# Patient Record
Sex: FEMALE | Race: WHITE | NOT HISPANIC OR LATINO | Employment: UNEMPLOYED | URBAN - METROPOLITAN AREA
[De-identification: names, ages, dates, MRNs, and addresses within clinical notes are randomized per-mention and may not be internally consistent; named-entity substitution may affect disease eponyms.]

---

## 2022-01-20 ENCOUNTER — HOSPITAL ENCOUNTER (EMERGENCY)
Facility: HOSPITAL | Age: 27
Discharge: HOME | End: 2022-01-21
Attending: EMERGENCY MEDICINE | Admitting: EMERGENCY MEDICINE

## 2022-01-20 DIAGNOSIS — R11.2 NON-INTRACTABLE VOMITING WITH NAUSEA, UNSPECIFIED VOMITING TYPE: ICD-10-CM

## 2022-01-20 DIAGNOSIS — R19.7 DIARRHEA, UNSPECIFIED TYPE: ICD-10-CM

## 2022-01-20 DIAGNOSIS — R10.11 RIGHT UPPER QUADRANT ABDOMINAL PAIN: Primary | ICD-10-CM

## 2022-01-20 DIAGNOSIS — R55 VASOVAGAL SYNCOPE: ICD-10-CM

## 2022-01-20 LAB
ALBUMIN SERPL-MCNC: 4.6 G/DL (ref 3.5–5.2)
ALBUMIN/GLOB SERPL: 1.8 G/DL
ALP SERPL-CCNC: 76 U/L (ref 39–117)
ALT SERPL W P-5'-P-CCNC: 17 U/L (ref 1–33)
ANION GAP SERPL CALCULATED.3IONS-SCNC: 10.7 MMOL/L (ref 5–15)
AST SERPL-CCNC: 20 U/L (ref 1–32)
BASOPHILS # BLD AUTO: 0.08 10*3/MM3 (ref 0–0.2)
BASOPHILS NFR BLD AUTO: 0.9 % (ref 0–1.5)
BILIRUB SERPL-MCNC: 0.2 MG/DL (ref 0–1.2)
BUN SERPL-MCNC: 10 MG/DL (ref 6–20)
BUN/CREAT SERPL: 10.5 (ref 7–25)
CALCIUM SPEC-SCNC: 8.9 MG/DL (ref 8.6–10.5)
CHLORIDE SERPL-SCNC: 106 MMOL/L (ref 98–107)
CO2 SERPL-SCNC: 22.3 MMOL/L (ref 22–29)
CREAT SERPL-MCNC: 0.95 MG/DL (ref 0.57–1)
DEPRECATED RDW RBC AUTO: 45.9 FL (ref 37–54)
EOSINOPHIL # BLD AUTO: 0.16 10*3/MM3 (ref 0–0.4)
EOSINOPHIL NFR BLD AUTO: 1.8 % (ref 0.3–6.2)
ERYTHROCYTE [DISTWIDTH] IN BLOOD BY AUTOMATED COUNT: 14.5 % (ref 12.3–15.4)
GFR SERPL CREATININE-BSD FRML MDRD: 70 ML/MIN/1.73
GLOBULIN UR ELPH-MCNC: 2.5 GM/DL
GLUCOSE SERPL-MCNC: 117 MG/DL (ref 65–99)
HCG SERPL QL: NEGATIVE
HCT VFR BLD AUTO: 38.2 % (ref 34–46.6)
HGB BLD-MCNC: 12.6 G/DL (ref 12–15.9)
HOLD SPECIMEN: NORMAL
IMM GRANULOCYTES # BLD AUTO: 0.01 10*3/MM3 (ref 0–0.05)
IMM GRANULOCYTES NFR BLD AUTO: 0.1 % (ref 0–0.5)
LYMPHOCYTES # BLD AUTO: 2.57 10*3/MM3 (ref 0.7–3.1)
LYMPHOCYTES NFR BLD AUTO: 29.6 % (ref 19.6–45.3)
MAGNESIUM SERPL-MCNC: 1.9 MG/DL (ref 1.6–2.6)
MCH RBC QN AUTO: 28.3 PG (ref 26.6–33)
MCHC RBC AUTO-ENTMCNC: 33 G/DL (ref 31.5–35.7)
MCV RBC AUTO: 85.8 FL (ref 79–97)
MONOCYTES # BLD AUTO: 0.81 10*3/MM3 (ref 0.1–0.9)
MONOCYTES NFR BLD AUTO: 9.3 % (ref 5–12)
NEUTROPHILS NFR BLD AUTO: 5.04 10*3/MM3 (ref 1.7–7)
NEUTROPHILS NFR BLD AUTO: 58.3 % (ref 42.7–76)
NRBC BLD AUTO-RTO: 0 /100 WBC (ref 0–0.2)
PLATELET # BLD AUTO: 208 10*3/MM3 (ref 140–450)
PMV BLD AUTO: 10.8 FL (ref 6–12)
POTASSIUM SERPL-SCNC: 3.6 MMOL/L (ref 3.5–5.2)
PROT SERPL-MCNC: 7.1 G/DL (ref 6–8.5)
RBC # BLD AUTO: 4.45 10*6/MM3 (ref 3.77–5.28)
SODIUM SERPL-SCNC: 139 MMOL/L (ref 136–145)
TROPONIN T SERPL-MCNC: <0.01 NG/ML (ref 0–0.03)
WBC NRBC COR # BLD: 8.67 10*3/MM3 (ref 3.4–10.8)
WHOLE BLOOD HOLD SPECIMEN: NORMAL
WHOLE BLOOD HOLD SPECIMEN: NORMAL

## 2022-01-20 PROCEDURE — 83690 ASSAY OF LIPASE: CPT | Performed by: EMERGENCY MEDICINE

## 2022-01-20 PROCEDURE — 84484 ASSAY OF TROPONIN QUANT: CPT | Performed by: EMERGENCY MEDICINE

## 2022-01-20 PROCEDURE — 93005 ELECTROCARDIOGRAM TRACING: CPT | Performed by: EMERGENCY MEDICINE

## 2022-01-20 PROCEDURE — 83735 ASSAY OF MAGNESIUM: CPT | Performed by: EMERGENCY MEDICINE

## 2022-01-20 PROCEDURE — 85025 COMPLETE CBC W/AUTO DIFF WBC: CPT | Performed by: EMERGENCY MEDICINE

## 2022-01-20 PROCEDURE — 99284 EMERGENCY DEPT VISIT MOD MDM: CPT

## 2022-01-20 PROCEDURE — 84703 CHORIONIC GONADOTROPIN ASSAY: CPT | Performed by: EMERGENCY MEDICINE

## 2022-01-20 PROCEDURE — 93005 ELECTROCARDIOGRAM TRACING: CPT

## 2022-01-20 PROCEDURE — 80053 COMPREHEN METABOLIC PANEL: CPT | Performed by: EMERGENCY MEDICINE

## 2022-01-20 PROCEDURE — 36415 COLL VENOUS BLD VENIPUNCTURE: CPT | Performed by: EMERGENCY MEDICINE

## 2022-01-20 RX ORDER — ONDANSETRON 2 MG/ML
4 INJECTION INTRAMUSCULAR; INTRAVENOUS ONCE
Status: COMPLETED | OUTPATIENT
Start: 2022-01-21 | End: 2022-01-21

## 2022-01-20 RX ORDER — SODIUM CHLORIDE 0.9 % (FLUSH) 0.9 %
10 SYRINGE (ML) INJECTION AS NEEDED
Status: DISCONTINUED | OUTPATIENT
Start: 2022-01-20 | End: 2022-01-21 | Stop reason: HOSPADM

## 2022-01-21 ENCOUNTER — APPOINTMENT (OUTPATIENT)
Dept: ULTRASOUND IMAGING | Facility: HOSPITAL | Age: 27
End: 2022-01-21

## 2022-01-21 VITALS
HEIGHT: 60 IN | HEART RATE: 84 BPM | WEIGHT: 135.36 LBS | OXYGEN SATURATION: 98 % | TEMPERATURE: 98.1 F | RESPIRATION RATE: 16 BRPM | SYSTOLIC BLOOD PRESSURE: 102 MMHG | BODY MASS INDEX: 26.58 KG/M2 | DIASTOLIC BLOOD PRESSURE: 63 MMHG

## 2022-01-21 LAB
BILIRUB UR QL STRIP: NEGATIVE
CLARITY UR: CLEAR
COLOR UR: YELLOW
GLUCOSE UR STRIP-MCNC: NEGATIVE MG/DL
HGB UR QL STRIP.AUTO: NEGATIVE
KETONES UR QL STRIP: NEGATIVE
LEUKOCYTE ESTERASE UR QL STRIP.AUTO: NEGATIVE
LIPASE SERPL-CCNC: 19 U/L (ref 13–60)
NITRITE UR QL STRIP: NEGATIVE
PH UR STRIP.AUTO: 6.5 [PH] (ref 5–8)
PROT UR QL STRIP: NEGATIVE
QT INTERVAL: 358 MS
SP GR UR STRIP: 1.01 (ref 1–1.03)
UROBILINOGEN UR QL STRIP: NORMAL

## 2022-01-21 PROCEDURE — 96374 THER/PROPH/DIAG INJ IV PUSH: CPT

## 2022-01-21 PROCEDURE — 81003 URINALYSIS AUTO W/O SCOPE: CPT | Performed by: EMERGENCY MEDICINE

## 2022-01-21 PROCEDURE — 76705 ECHO EXAM OF ABDOMEN: CPT

## 2022-01-21 PROCEDURE — U0004 COV-19 TEST NON-CDC HGH THRU: HCPCS | Performed by: EMERGENCY MEDICINE

## 2022-01-21 PROCEDURE — 99284 EMERGENCY DEPT VISIT MOD MDM: CPT

## 2022-01-21 PROCEDURE — 25010000002 ONDANSETRON PER 1 MG: Performed by: EMERGENCY MEDICINE

## 2022-01-21 RX ORDER — DICYCLOMINE HCL 20 MG
20 TABLET ORAL EVERY 6 HOURS
Qty: 28 TABLET | Refills: 0 | Status: SHIPPED | OUTPATIENT
Start: 2022-01-21 | End: 2022-01-28

## 2022-01-21 RX ORDER — ONDANSETRON 8 MG/1
8 TABLET, ORALLY DISINTEGRATING ORAL EVERY 8 HOURS PRN
Qty: 30 TABLET | Refills: 0 | Status: SHIPPED | OUTPATIENT
Start: 2022-01-21 | End: 2022-01-31

## 2022-01-21 RX ADMIN — SODIUM CHLORIDE 1000 ML: 9 INJECTION, SOLUTION INTRAVENOUS at 00:04

## 2022-01-21 RX ADMIN — ONDANSETRON 4 MG: 2 INJECTION INTRAMUSCULAR; INTRAVENOUS at 00:02

## 2022-01-21 NOTE — ED PROVIDER NOTES
Time: 1:25 AM EST  Arrived by: private car  Chief Complaint: Abdominal pain and diarrhea  History provided by: Patient  History is limited by: N/A     History of Present Illness:  Patient is a 27 y.o. year old female that presents to the emergency department with with abdominal pain and diarrhea.  The patient states that she has had nausea with intermittent emesis for 3 days.  The patient denies any coffee-ground emesis or hematemesis.  The patient also notes intermittent abdominal pain located in the right upper quadrant.  She locates the pain in the right upper abdomen and radiates to the right side but not to the flank.  The patient states both the nausea, vomiting and abdominal pain seems to be associated with eating.  The patient also notes that she has had diarrhea which she described as watery stools several times a day for the last 2 days.  The patient denies any fevers or rigors.  The patient denies any headache.  The patient denies any sore throat, cough, nasal congestion, chest congestion or shortness of breath.  The patient has had no rash.  The patient's last bowel movement was prior to arrival.  The patient denies any urinary frequency, urgency or dysuria.  The patient has had no gross hematuria.  The patient denies pregnancy as she has had bilateral salpingectomy.  The patient does have a history of PCOD.  Tonight, the patient states that she became nauseated felt like she was going to vomit and have diarrhea.  The patient states that she became lightheaded, sweaty and then passed out.  The patient states that she did not hurt her self.  The patient states that she passed out only transiently.  And returned back to her baseline.  Currently, the patient is complaining of right upper quadrant pain.  Patient has had no prior history of DVT or pulmonary embolism.  The patient has had no recent travel.  The patient has had no recent immobilization.  The patient is not on estrogen.  The patient has no  unilateral leg swelling or calf pain.      Similar Symptoms Previously: The patient states that she did have similar symptoms during her pregnancy and was told it was due to a poorly functioning gallbladder  Recently seen: No      Patient Care Team  Primary Care Provider: Enoch Rios APRN    Past Medical History:     No Known Allergies  Past Medical History:   Diagnosis Date   • ADHD    • PCOS (polycystic ovarian syndrome)      Past Surgical History:   Procedure Laterality Date   • SALPINGECTOMY     • TUBAL ABDOMINAL LIGATION       History reviewed. No pertinent family history.    Home Medications:  Prior to Admission medications    Medication Sig Start Date End Date Taking? Authorizing Provider   acetaminophen (TYLENOL) 500 MG tablet Take 1 tablet by mouth Every 6 (Six) Hours As Needed for Mild Pain . 11/21/21   Bridget Riley MD   azithromycin (Zithromax Z-Kiel) 250 MG tablet Take 2 tab po today then take 1 tab po qd x 4 days 11/21/21   Bridget Riley MD   Benzocaine-Menthol (Cepacol) 15-2.3 MG lozenge Dissolve 1 lozenge in the mouth 4 (Four) Times a Day. 11/21/21   Bridget Riley MD   benzonatate (TESSALON) 200 MG capsule Take 1 capsule by mouth 3 (Three) Times a Day As Needed for Cough. 11/21/21   Bridget Riley MD   brompheniramine-pseudoephedrine-DM 30-2-10 MG/5ML syrup Take 5 mL by mouth 4 (Four) Times a Day As Needed for Cough or Allergies. 11/21/21   Bridget Riley MD   dicyclomine (BENTYL) 20 MG tablet Take 1 tablet by mouth Every 6 (Six) Hours for 7 days. 1/21/22 1/28/22  Toribio Bear DO   ondansetron ODT (ZOFRAN-ODT) 8 MG disintegrating tablet Place 1 tablet on the tongue Every 8 (Eight) Hours As Needed for Nausea or Vomiting for up to 10 days. 1/21/22 1/31/22  Toribio Bear DO        Social History:   Social History     Tobacco Use   • Smoking status: Current Every Day Smoker     Packs/day: 0.50     Types: Cigarettes   • Smokeless tobacco: Never Used   Vaping Use   • Vaping Use: Never used  "  Substance Use Topics   • Alcohol use: Never   • Drug use: Never          Record Review:  I have reviewed the patient's records in [facility].     Review of Systems:  Review of Systems   Constitutional: Positive for appetite change. Negative for chills, diaphoresis, fatigue and fever.   HENT: Negative for congestion, postnasal drip, rhinorrhea, sinus pressure and sore throat.    Eyes: Negative for photophobia.   Respiratory: Negative for apnea, cough, chest tightness, shortness of breath and wheezing.    Cardiovascular: Negative for chest pain, palpitations and leg swelling.   Gastrointestinal: Positive for abdominal pain, diarrhea, nausea and vomiting.   Genitourinary: Negative for difficulty urinating, dysuria, flank pain, frequency, hematuria and urgency.   Musculoskeletal: Negative for arthralgias, back pain, gait problem, joint swelling, myalgias and neck pain.   Skin: Negative for pallor and rash.   Neurological: Positive for syncope. Negative for dizziness, seizures, speech difficulty, weakness, numbness and headaches.   Hematological: Negative.    Psychiatric/Behavioral: Negative.            Physical Exam:  /63 (BP Location: Right arm, Patient Position: Lying)   Pulse 84   Temp 98.1 °F (36.7 °C) (Oral)   Resp 16   Ht 152.4 cm (60\")   Wt 61.4 kg (135 lb 5.8 oz)   SpO2 98%   BMI 26.44 kg/m²     Physical Exam  Vitals and nursing note reviewed.   Constitutional:       General: She is not in acute distress.     Appearance: She is well-developed. She is not ill-appearing, toxic-appearing or diaphoretic.   HENT:      Head: Normocephalic and atraumatic.      Mouth/Throat:      Mouth: Mucous membranes are moist.   Eyes:      Extraocular Movements: Extraocular movements intact.   Cardiovascular:      Rate and Rhythm: Normal rate and regular rhythm.      Pulses: Normal pulses.      Heart sounds: Normal heart sounds. No murmur heard.      Pulmonary:      Effort: Pulmonary effort is normal. No accessory muscle " usage, respiratory distress or retractions.      Breath sounds: Normal breath sounds. No wheezing, rhonchi or rales.   Chest:      Chest wall: No tenderness.   Abdominal:      General: Abdomen is flat. Bowel sounds are normal. There is no distension.      Palpations: Abdomen is soft. There is no hepatomegaly, mass or pulsatile mass.      Tenderness: There is abdominal tenderness in the right upper quadrant. There is no right CVA tenderness, left CVA tenderness, guarding or rebound. Negative signs include Warren's sign and McBurney's sign.      Comments: No rigidity   Musculoskeletal:         General: Normal range of motion.      Cervical back: Normal range of motion and neck supple.      Right lower leg: No edema.      Left lower leg: No edema.   Skin:     General: Skin is warm and dry.      Capillary Refill: Capillary refill takes less than 2 seconds.      Coloration: Skin is not cyanotic, jaundiced or pale.   Neurological:      General: No focal deficit present.      Mental Status: She is alert and oriented to person, place, and time. Mental status is at baseline.      Motor: No weakness.   Psychiatric:         Mood and Affect: Mood normal.         Behavior: Behavior normal.                Medications in the Emergency Department:  Medications   sodium chloride 0.9 % flush 10 mL (has no administration in time range)   sodium chloride 0.9 % bolus 1,000 mL (0 mL Intravenous Stopped 1/21/22 0100)   ondansetron (ZOFRAN) injection 4 mg (4 mg Intravenous Given 1/21/22 0002)        Labs  Lab Results (last 24 hours)     Procedure Component Value Units Date/Time    CBC & Differential [[id number]]  (Normal) Collected: 01/20/22 2246    Specimen: Blood Updated: 01/20/22 2302    Narrative:      The following orders were created for panel order CBC & Differential.  Procedure                               Abnormality         Status                     ---------                               -----------         ------                      CBC Auto Differential[[id number]]        Normal              Final result                 Please view results for these tests on the individual orders.    Comprehensive Metabolic Panel [[id number]]  (Abnormal) Collected: 01/20/22 2246    Specimen: Blood Updated: 01/20/22 2329     Glucose 117 mg/dL      BUN 10 mg/dL      Creatinine 0.95 mg/dL      Sodium 139 mmol/L      Potassium 3.6 mmol/L      Chloride 106 mmol/L      CO2 22.3 mmol/L      Calcium 8.9 mg/dL      Total Protein 7.1 g/dL      Albumin 4.60 g/dL      ALT (SGPT) 17 U/L      AST (SGOT) 20 U/L      Alkaline Phosphatase 76 U/L      Total Bilirubin 0.2 mg/dL      eGFR Non African Amer 70 mL/min/1.73      Globulin 2.5 gm/dL      A/G Ratio 1.8 g/dL      BUN/Creatinine Ratio 10.5     Anion Gap 10.7 mmol/L     Narrative:      GFR Normal >60  Chronic Kidney Disease <60  Kidney Failure <15      Magnesium [[id number]]  (Normal) Collected: 01/20/22 2246    Specimen: Blood Updated: 01/20/22 2329     Magnesium 1.9 mg/dL     Troponin [[id number]]  (Normal) Collected: 01/20/22 2246    Specimen: Blood Updated: 01/20/22 2329     Troponin T <0.010 ng/mL     Narrative:      Troponin T Reference Range:  <= 0.03 ng/mL-   Negative for AMI  >0.03 ng/mL-     Abnormal for myocardial necrosis.  Clinicians would have to utilize clinical acumen, EKG, Troponin and serial changes to determine if it is an Acute Myocardial Infarction or myocardial injury due to an underlying chronic condition.       Results may be falsely decreased if patient taking Biotin.      hCG, Serum, Qualitative [[id number]]  (Normal) Collected: 01/20/22 2246    Specimen: Blood Updated: 01/20/22 2315     HCG Qualitative Negative    Narrative:      Sensitive immunoassays may demonstrate false positive results  with specimens containing heterophilic antibodies. Assays may  also exhibit false-positive or false-negative results with  specimens containing human anti-mouse antibodies. These   specimens may come from  patients receving preparations of  mouse monoclonal antibodies for diagnosis or therapy or having  been exposed to mice. If the qualitative interpretation is  inconsistent with the clinical evaluation, results should be   confirmed by an alternate hCG method, ie. quantitative hCG.    CBC Auto Differential [[id number]]  (Normal) Collected: 01/20/22 2246    Specimen: Blood Updated: 01/20/22 2302     WBC 8.67 10*3/mm3      RBC 4.45 10*6/mm3      Hemoglobin 12.6 g/dL      Hematocrit 38.2 %      MCV 85.8 fL      MCH 28.3 pg      MCHC 33.0 g/dL      RDW 14.5 %      RDW-SD 45.9 fl      MPV 10.8 fL      Platelets 208 10*3/mm3      Neutrophil % 58.3 %      Lymphocyte % 29.6 %      Monocyte % 9.3 %      Eosinophil % 1.8 %      Basophil % 0.9 %      Immature Grans % 0.1 %      Neutrophils, Absolute 5.04 10*3/mm3      Lymphocytes, Absolute 2.57 10*3/mm3      Monocytes, Absolute 0.81 10*3/mm3      Eosinophils, Absolute 0.16 10*3/mm3      Basophils, Absolute 0.08 10*3/mm3      Immature Grans, Absolute 0.01 10*3/mm3      nRBC 0.0 /100 WBC     Lipase [[id number]]  (Normal) Collected: 01/20/22 2246    Specimen: Blood Updated: 01/21/22 0007     Lipase 19 U/L     Urinalysis With Culture If Indicated - Urine, Clean Catch [[id number]]  (Normal) Collected: 01/21/22 0013    Specimen: Urine, Clean Catch Updated: 01/21/22 0025     Color, UA Yellow     Appearance, UA Clear     pH, UA 6.5     Specific Gravity, UA 1.010     Glucose, UA Negative     Ketones, UA Negative     Bilirubin, UA Negative     Blood, UA Negative     Protein, UA Negative     Leuk Esterase, UA Negative     Nitrite, UA Negative     Urobilinogen, UA 0.2 E.U./dL    Narrative:      Urine microscopic not indicated.           Imaging:  US Gallbladder   Final Result       No acute cholecystitis. No gallstones. No biliary ductal dilatation. The other findings are as    detailed above.                   ALKA VERNON JR, MD          Electronically Signed and Approved By: ALKA FINCH  JANEEN TINAJERO MD on 1/21/2022 at 0:40                               Procedures:  Procedures    Progress  ED Course as of 01/21/22 0133   Fri Jan 21, 2022   0121 EKG:    Rhythm: Normal sinus rhythm  Rate: 91  Intervals: Normal OR and QT interval  T-wave: Nonspecific T wave flattening in III  ST Segment: No obvious pathological ST elevation or ST depression    EKG Comparison: None available    Interpreted by me   [SD]      ED Course User Index  [SD] Toribio Bear DO                            Medical Decision Making:  MDM  Number of Diagnoses or Management Options  Diarrhea, unspecified type  Non-intractable vomiting with nausea, unspecified vomiting type  Right upper quadrant abdominal pain  Vasovagal syncope  Diagnosis management comments:     PERC Rule for Pulmonary Embolism - MDCalc  Calculated on Jan 21 2022 1:31 AM  0 criteria -> No need for further workup, as <2% chance of PE. If no criteria are positive and clinician's pre-test probability is <15%, PERC Rule criteria are satisfied.          The patient is resting comfortably and feels better, is alert and in no distress.  Repeat examination is unremarkable and benign; in particular, there is no discomfort at McBurney's point and there is no pulsatile mass.  The history, exam, diagnostic testing and current condition does not suggest acute appendicitis, bowel obstruction, acute cholecystitis bowel perforation, major gastrointestinal bleeding, severe diverticulitis, abdominal aortic aneurysm, mesenteric ischemia, volvulus, sepsis or other significant pathology that warrants further testing, continued ED treatment, admission for surgical evaluation at this point.  The vital signs have been stable.  The patient does not have uncontrolled pain intractable vomiting or other significant symptoms.  The patient's condition is stable and appropriate for discharge from the emergency department.  The patient will follow up with her primary care physician for serial  reexamination of the abdomen tomorrow.  The patient was instructed to return should they have worsening pain, intractable vomiting, fever or new symptoms    The patient had no dysrhythmias in the emergency room.  The patient's vital signs were stable.  The patient's EKG was normal.  The patient became nauseated and had the feeling of diarrhea at home.  Then the patient had a syncopal episode.  The patient had a normal chemistry, including BUN and creatinine and electrolytes.  The patient had a normal hemoglobin hematocrit.  It is thought that the patient has a cause-and-effect relationship for a vasovagal event.  I have discussed this with the patient and she has voiced understanding.       Amount and/or Complexity of Data Reviewed  Clinical lab tests: reviewed  Tests in the radiology section of CPT®: reviewed  Tests in the medicine section of CPT®: reviewed               Final diagnoses:   Right upper quadrant abdominal pain   Non-intractable vomiting with nausea, unspecified vomiting type   Diarrhea, unspecified type   Vasovagal syncope        Disposition:  ED Disposition     ED Disposition Condition Comment    Discharge Stable           This medical record created using voice recognition software and may contain unintended errors.    DO Chema Douglas Scott, DO  01/24/22 0637

## 2022-01-21 NOTE — DISCHARGE INSTRUCTIONS
Please drink clear liquids for the next 12 hours and then advance your diet very slowly.    Please push oral fluids    Follow-up with your doctor tomorrow for serial reexamination the abdomen and to review your COVID-19 results    Discuss possible need for HIDA scan of the gallbladder with your primary care physician    Return to the emergency room for intractable pain, intractable vomiting, passing out, fever, chest pain, shortness of breath, unusual fatigue, unusual sweating or any new symptoms you are concerned with

## 2022-01-21 NOTE — ED TRIAGE NOTES
Pt to ED 9 with c/o intermittent fatigue/nausea/diarrhea/abd pain over last several days. Pt reports recently moved here and has not established a pcp yet.   Pt reports has been having this issues for awhile and former pcp wanted her to see specialist but sx had resolved and pt was in middle of moving.  Pt states before has felt like going to pass out but this had not happened until tonight.  Pt reports x4 episodes of emesis over last couple days, denies blood in emesis.  Pt presents a/ox4, gcs 15, in nad while sitting up in bed.

## 2022-01-22 LAB — SARS-COV-2 RNA PNL SPEC NAA+PROBE: NOT DETECTED

## 2022-02-07 ENCOUNTER — OFFICE VISIT (OUTPATIENT)
Dept: OBSTETRICS AND GYNECOLOGY | Facility: CLINIC | Age: 27
End: 2022-02-07

## 2022-02-07 VITALS
BODY MASS INDEX: 26.11 KG/M2 | SYSTOLIC BLOOD PRESSURE: 110 MMHG | HEIGHT: 60 IN | WEIGHT: 133 LBS | HEART RATE: 98 BPM | DIASTOLIC BLOOD PRESSURE: 61 MMHG

## 2022-02-07 DIAGNOSIS — E28.2 PCOS (POLYCYSTIC OVARIAN SYNDROME): ICD-10-CM

## 2022-02-07 DIAGNOSIS — L68.0 FEMALE HIRSUTISM: ICD-10-CM

## 2022-02-07 DIAGNOSIS — Z01.411 ENCOUNTER FOR GYNECOLOGICAL EXAMINATION WITH ABNORMAL FINDING: Primary | ICD-10-CM

## 2022-02-07 PROCEDURE — 99385 PREV VISIT NEW AGE 18-39: CPT | Performed by: OBSTETRICS & GYNECOLOGY

## 2022-02-07 PROCEDURE — G0123 SCREEN CERV/VAG THIN LAYER: HCPCS | Performed by: OBSTETRICS & GYNECOLOGY

## 2022-02-07 PROCEDURE — 99213 OFFICE O/P EST LOW 20 MIN: CPT | Performed by: OBSTETRICS & GYNECOLOGY

## 2022-02-07 RX ORDER — SPIRONOLACTONE 100 MG/1
100 TABLET, FILM COATED ORAL DAILY
Qty: 90 TABLET | Refills: 3 | Status: SHIPPED | OUTPATIENT
Start: 2022-02-07 | End: 2022-11-05

## 2022-02-07 NOTE — PROGRESS NOTES
"Well Woman Visit    CC: Annual well woman exam       HPI:   27 y.o.No obstetric history on file. Contraception or HRT: APCONTRACEPTIONHRT: Contraception:  Bilateral salpingectomy  Menses:   q mon, lasts 5 days, changes products q 1hrs on heaviest days.   Pain:  Moderate, not too bad  Incontinence concerns: No  Hx of abnormal pap:  No  Pt has concerns she would like to discuss. c/o excessive chin hair growth due to pcos. Desires tx. Has tried laser hair removal twice.      History: PMHx, Meds, Allergies, PSHx, Social Hx, and POBHx all reviewed and updated.      PHYSICAL EXAM:  /61   Pulse 98   Ht 152.4 cm (60\")   Wt 60.3 kg (133 lb)   LMP 01/24/2022 (Approximate)   BMI 25.97 kg/m²   General- NAD, alert and oriented, appropriate  Psych- Normal mood, good memory  Neck- No masses, no thyroid enlargement  CV- Regular rhythm, no murnurs  Resp- CTA to bases, no wheezes  Abdomen- Soft, non distended, non tender, no masses    Breast left-  Bilaterally symmetrical, no masses, non tender, no nipple discharge  Breast right- Bilaterally symmetrical, no masses, non tender, no nipple discharge    External genitalia- Normal female, no lesions  Urethra/meatus- Normal, no masses, non tender, no prolapse  Bladder- Normal, no masses, non tender, no prolapse  Vagina- Normal, no atrophy, no lesions, no discharge, no prolapse  Cvx- Normal, no lesions, no discharge, No cervical motion tenderness  Uterus- Normal size, shape & consistency.  Non tender, mobile, & no prolapse  Adnexa- No mass, non tender  Anus/Rectum/Perineum- Not performed    Lymphatic- No palpable neck, axillary, or groin nodes  Ext- No edema, no cyanosis    Skin- No lesions, no rashes, no acanthosis nigricans        ASSESSMENT and PLAN:  WWE and Problem Visit    Diagnoses and all orders for this visit:    1. Encounter for gynecological examination with abnormal finding (Primary)  -     IGP,rfx Aptima HPV All Pth    2. PCOS (polycystic ovarian syndrome)  -     " spironolactone (Aldactone) 100 MG tablet; Take 1 tablet by mouth Daily.  Dispense: 90 tablet; Refill: 3    3. Female hirsutism  -     spironolactone (Aldactone) 100 MG tablet; Take 1 tablet by mouth Daily.  Dispense: 90 tablet; Refill: 3    discussed tx options. Pt desires spironolactone. Will f/u in 6 weeks to re-eval.    Counseling:     Track menses, RTO IF <q21d, >7d long, or heavy    Domestic violence/abuse screen: negative    Depression screen: no SI    Preventative:   BREAST HEALTH- Monthly self breast exam importance and how to reviewed. MMG and/or MRI (prn) reviewed per society guidelines and her individual history. Mammo/MRI screen: Not medically needed.  CERVICAL CANCER Screening- Reviewed current ASCCP guidelines for screening w and wo cotest HPV, age specific.  Screen: Updated today.  COLON CANCER Screening- Reviewed current medical society guidelines and options.  Colonoscopy screen:  Not medically needed.  SEXUAL HEALTH: Declines STD screening.  VACCINATIONS Recommended: Flu annually, Gardisil/HPV vaccine (up to 44yo).  Importance discussed, risk being unvaccinated reviewed.  Questions answered  SMOKING STATUS- pt does use nicotine and/or tobacco.  I reviewed importance of avoiding.  Options to quit offered per [employer] protocols.  Recommend FU w PCP regarding quitting options if unable to quit wo assistance.  Myriad: Does not qualify.  Gardasil status: completed.      She understands the importance of having any ordered tests to be performed in a timely fashion.  She is encouraged to review her results online and/or contact or office if she has questions.     Follow Up:  Return in about 6 weeks (around 3/21/2022) for med f/u-phone appt farideh.      Keke Fortune, APRN  02/07/2022

## 2022-02-09 LAB
CONV .: NORMAL
CYTOLOGIST CVX/VAG CYTO: NORMAL
CYTOLOGY CVX/VAG DOC CYTO: NORMAL
CYTOLOGY CVX/VAG DOC THIN PREP: NORMAL
DX ICD CODE: NORMAL
HIV 1 & 2 AB SER-IMP: NORMAL
Lab: NORMAL
OTHER STN SPEC: NORMAL
STAT OF ADQ CVX/VAG CYTO-IMP: NORMAL

## 2022-02-20 PROCEDURE — U0004 COV-19 TEST NON-CDC HGH THRU: HCPCS | Performed by: FAMILY MEDICINE

## 2022-03-21 ENCOUNTER — OFFICE VISIT (OUTPATIENT)
Dept: OBSTETRICS AND GYNECOLOGY | Facility: CLINIC | Age: 27
End: 2022-03-21

## 2022-03-21 DIAGNOSIS — E28.2 PCOS (POLYCYSTIC OVARIAN SYNDROME): ICD-10-CM

## 2022-03-21 DIAGNOSIS — L68.0 FEMALE HIRSUTISM: Primary | ICD-10-CM

## 2022-03-21 PROCEDURE — 99442 PR PHYS/QHP TELEPHONE EVALUATION 11-20 MIN: CPT | Performed by: OBSTETRICS & GYNECOLOGY

## 2022-03-21 NOTE — PROGRESS NOTES
GYN Problem/Follow Up Visit    Chief Complaint   Patient presents with   • Follow-up     meds      You have chosen to receive care through a telephone visit. Do you consent to use a telephone visit for your medical care today? Yes       HPI  Chelsy Patterson is a 27 y.o. female, No obstetric history on file., who presents for med f/u. Seen in office 2/7/22 with c/o excessive chin hair growth due to pcos. Has tried laser hair removal twice. Started on spironolactone at that time. Today states has not noticed any improvement. Does admit to not taking it every single day as directed but usually takes it.        Additional OB/GYN History   No LMP recorded. (Menstrual status: Tubal ligation).  Allergies : Patient has no known allergies.     The additional following portions of the patient's history were reviewed and updated as appropriate: allergies, current medications, past family history, past medical history, past social history, past surgical history and problem list.    Review of Systems    I have reviewed and agree with the HPI, ROS, and historical information as entered above. Keke Fortune, JENNIFER    Objective   There were no vitals taken for this visit.    Physical Exam  Neurological:      Mental Status: She is alert and oriented to person, place, and time.            Assessment and Plan    Diagnoses and all orders for this visit:    1. Female hirsutism (Primary)  -     Ambulatory Referral to Dermatology    2. PCOS (polycystic ovarian syndrome)  -     Ambulatory Referral to Dermatology    discussed taking med daily. Set alarm/reminder if needed. Discussed seeing derm for an eval and pt desires. Will place referral. Pt states will continue spironolactone also. rto prn.    Counseling:  She understands the importance of having the above orders performed in a timely fashion.  She is encouraged to review her results online and/or contact or office if she has questions.     Follow Up:  Return if symptoms worsen or  fail to improve.    This visit has been rescheduled as a phone visit to comply with patient safety concerns in accordance with CDC recommendations. Total time of discussion was 11 minutes.      JENNIFER Kearns  03/21/2022

## 2022-06-15 ENCOUNTER — LAB (OUTPATIENT)
Dept: LAB | Facility: HOSPITAL | Age: 27
End: 2022-06-15

## 2022-06-15 ENCOUNTER — TRANSCRIBE ORDERS (OUTPATIENT)
Dept: ADMINISTRATIVE | Facility: HOSPITAL | Age: 27
End: 2022-06-15

## 2022-06-15 DIAGNOSIS — L64.8 OTHER ANDROGENIC ALOPECIA: ICD-10-CM

## 2022-06-15 DIAGNOSIS — L64.8 OTHER ANDROGENIC ALOPECIA: Primary | ICD-10-CM

## 2022-06-15 LAB
25(OH)D3 SERPL-MCNC: 32.8 NG/ML (ref 30–100)
ALBUMIN SERPL-MCNC: 4.3 G/DL (ref 3.5–5.2)
ALBUMIN/GLOB SERPL: 1.5 G/DL
ALP SERPL-CCNC: 71 U/L (ref 39–117)
ALT SERPL W P-5'-P-CCNC: 7 U/L (ref 1–33)
ANION GAP SERPL CALCULATED.3IONS-SCNC: 12 MMOL/L (ref 5–15)
AST SERPL-CCNC: 13 U/L (ref 1–32)
BASOPHILS # BLD AUTO: 0.06 10*3/MM3 (ref 0–0.2)
BASOPHILS NFR BLD AUTO: 1 % (ref 0–1.5)
BILIRUB SERPL-MCNC: 0.4 MG/DL (ref 0–1.2)
BUN SERPL-MCNC: 4 MG/DL (ref 6–20)
BUN/CREAT SERPL: 4.9 (ref 7–25)
CALCIUM SPEC-SCNC: 8.9 MG/DL (ref 8.6–10.5)
CHLORIDE SERPL-SCNC: 102 MMOL/L (ref 98–107)
CO2 SERPL-SCNC: 25 MMOL/L (ref 22–29)
CREAT SERPL-MCNC: 0.82 MG/DL (ref 0.57–1)
DEPRECATED RDW RBC AUTO: 40.7 FL (ref 37–54)
EGFRCR SERPLBLD CKD-EPI 2021: 99.4 ML/MIN/1.73
EOSINOPHIL # BLD AUTO: 0.14 10*3/MM3 (ref 0–0.4)
EOSINOPHIL NFR BLD AUTO: 2.2 % (ref 0.3–6.2)
ERYTHROCYTE [DISTWIDTH] IN BLOOD BY AUTOMATED COUNT: 13.1 % (ref 12.3–15.4)
FERRITIN SERPL-MCNC: 14.9 NG/ML (ref 13–150)
GLOBULIN UR ELPH-MCNC: 2.8 GM/DL
GLUCOSE SERPL-MCNC: 110 MG/DL (ref 65–99)
HCT VFR BLD AUTO: 39.8 % (ref 34–46.6)
HGB BLD-MCNC: 13 G/DL (ref 12–15.9)
IMM GRANULOCYTES # BLD AUTO: 0.01 10*3/MM3 (ref 0–0.05)
IMM GRANULOCYTES NFR BLD AUTO: 0.2 % (ref 0–0.5)
IRON 24H UR-MRATE: 79 MCG/DL (ref 37–145)
IRON SATN MFR SERPL: 17 % (ref 20–50)
LYMPHOCYTES # BLD AUTO: 1.83 10*3/MM3 (ref 0.7–3.1)
LYMPHOCYTES NFR BLD AUTO: 29.4 % (ref 19.6–45.3)
MCH RBC QN AUTO: 28.2 PG (ref 26.6–33)
MCHC RBC AUTO-ENTMCNC: 32.7 G/DL (ref 31.5–35.7)
MCV RBC AUTO: 86.3 FL (ref 79–97)
MONOCYTES # BLD AUTO: 0.53 10*3/MM3 (ref 0.1–0.9)
MONOCYTES NFR BLD AUTO: 8.5 % (ref 5–12)
NEUTROPHILS NFR BLD AUTO: 3.66 10*3/MM3 (ref 1.7–7)
NEUTROPHILS NFR BLD AUTO: 58.7 % (ref 42.7–76)
NRBC BLD AUTO-RTO: 0 /100 WBC (ref 0–0.2)
PLATELET # BLD AUTO: 224 10*3/MM3 (ref 140–450)
PMV BLD AUTO: 12 FL (ref 6–12)
POTASSIUM SERPL-SCNC: 3.7 MMOL/L (ref 3.5–5.2)
PROT SERPL-MCNC: 7.1 G/DL (ref 6–8.5)
RBC # BLD AUTO: 4.61 10*6/MM3 (ref 3.77–5.28)
SODIUM SERPL-SCNC: 139 MMOL/L (ref 136–145)
T3FREE SERPL-MCNC: 4.5 PG/ML (ref 2–4.4)
T4 FREE SERPL-MCNC: 1.25 NG/DL (ref 0.93–1.7)
TIBC SERPL-MCNC: 459 MCG/DL (ref 298–536)
TRANSFERRIN SERPL-MCNC: 308 MG/DL (ref 200–360)
TSH SERPL DL<=0.05 MIU/L-ACNC: 1.93 UIU/ML (ref 0.27–4.2)
WBC NRBC COR # BLD: 6.23 10*3/MM3 (ref 3.4–10.8)

## 2022-06-15 PROCEDURE — 86038 ANTINUCLEAR ANTIBODIES: CPT

## 2022-06-15 PROCEDURE — 84403 ASSAY OF TOTAL TESTOSTERONE: CPT

## 2022-06-15 PROCEDURE — 80050 GENERAL HEALTH PANEL: CPT

## 2022-06-15 PROCEDURE — 84466 ASSAY OF TRANSFERRIN: CPT

## 2022-06-15 PROCEDURE — 82728 ASSAY OF FERRITIN: CPT

## 2022-06-15 PROCEDURE — 84439 ASSAY OF FREE THYROXINE: CPT

## 2022-06-15 PROCEDURE — 82306 VITAMIN D 25 HYDROXY: CPT

## 2022-06-15 PROCEDURE — 83540 ASSAY OF IRON: CPT

## 2022-06-15 PROCEDURE — 84481 FREE ASSAY (FT-3): CPT

## 2022-06-15 PROCEDURE — 36415 COLL VENOUS BLD VENIPUNCTURE: CPT

## 2022-06-16 LAB — ANA SER QL IF: NEGATIVE

## 2022-06-21 LAB — TESTOST SERPL-MCNC: 47 NG/DL

## 2023-04-18 ENCOUNTER — OFFICE VISIT (OUTPATIENT)
Dept: OBSTETRICS AND GYNECOLOGY | Facility: CLINIC | Age: 28
End: 2023-04-18
Payer: COMMERCIAL

## 2023-04-18 VITALS
HEIGHT: 60 IN | DIASTOLIC BLOOD PRESSURE: 65 MMHG | SYSTOLIC BLOOD PRESSURE: 108 MMHG | BODY MASS INDEX: 24.15 KG/M2 | WEIGHT: 123 LBS | HEART RATE: 90 BPM

## 2023-04-18 DIAGNOSIS — R10.2 PELVIC PAIN IN FEMALE: Primary | ICD-10-CM

## 2023-04-18 DIAGNOSIS — Z91.89 INCREASED RISK FOR HEREDITARY CANCER SYNDROME: ICD-10-CM

## 2023-04-18 DIAGNOSIS — N92.0 MENORRHAGIA WITH REGULAR CYCLE: ICD-10-CM

## 2023-04-18 DIAGNOSIS — Z80.3 FAMILY HISTORY OF BREAST CANCER: ICD-10-CM

## 2023-04-18 DIAGNOSIS — Z80.9 INCREASED RISK FOR HEREDITARY CANCER SYNDROME: ICD-10-CM

## 2023-04-18 NOTE — PROGRESS NOTES
"GYN Problem/Follow Up Visit    Chief Complaint   Patient presents with   • Pelvic Pain           HPI  Chelsy Patterson is a 28 y.o. female, No obstetric history on file., who presents for pelvic pain. States menses and pain have been getting worse for awhile. q mon menses x 4-5 days, 2 heavy days, changing products hourly at times. C/o cramping right before, during, and right after her menses. Has also noticed pelvic pain sometimes during intercourse and at other random times. Feels pain/pressure when she squats down. States the pain is worse in the left side of her pelvis. Has pcos and has hx of ovarian cysts but states this feels different. Denies vaginal sx. Also states is adopted but recently found out a lot of fam hx.      Additional OB/GYN History   Patient's last menstrual period was 04/04/2023 (approximate).  Current contraception: contraceptive methods: salpingectomy  Desires to: continue contraception  Allergies : Patient has no known allergies.     The additional following portions of the patient's history were reviewed and updated as appropriate: allergies, current medications, past family history, past medical history, past social history, past surgical history and problem list.    Review of Systems    I have reviewed and agree with the HPI, ROS, and historical information as entered above. Keke Fortune, APRN    Objective   /65   Pulse 90   Ht 152.4 cm (60\")   Wt 55.8 kg (123 lb)   LMP 04/04/2023 (Approximate)   BMI 24.02 kg/m²     Physical Exam  Vitals reviewed.   Genitourinary:     General: Normal vulva.      Vagina: Normal.      Cervix: Normal.      Uterus: Normal. Not tender.       Adnexa:         Right: Tenderness present.         Left: Tenderness present.    Skin:     General: Skin is warm and dry.   Neurological:      Mental Status: She is alert and oriented to person, place, and time.            Assessment and Plan    Diagnoses and all orders for this visit:    1. Pelvic pain in " female (Primary)  -     US Non-ob Transvaginal; Future    2. Menorrhagia with regular cycle  -     US Non-ob Transvaginal; Future    3. Family history of breast cancer  -     [facility] Hereditary Cancer Screen    4. Increased risk for hereditary cancer syndrome  -     [facility] Hereditary Cancer Screen    will check pelvic u/s. Discussed hormonal agents to help with sx. Pt states she has tried bcp and nexplanon and did not do well with the side effects. May also need eval by gyn doc. Will f/u after u/s. Pt is also a candidate for genetic testing and desires proceeding with that.     Counseling:  She understands the importance of having the above orders performed in a timely fashion.  She is encouraged to review her results online and/or contact or office if she has questions.     Follow Up:  Return in about 6 weeks (around 5/30/2023) for myriad f/u, u/s f/u.      Keke Fortune, JENNIFER  04/18/2023

## 2023-04-20 ENCOUNTER — HOSPITAL ENCOUNTER (OUTPATIENT)
Dept: ULTRASOUND IMAGING | Facility: HOSPITAL | Age: 28
Discharge: HOME | End: 2023-04-20
Admitting: OBSTETRICS & GYNECOLOGY
Payer: COMMERCIAL

## 2023-04-20 DIAGNOSIS — R10.2 PELVIC PAIN IN FEMALE: ICD-10-CM

## 2023-04-20 DIAGNOSIS — N92.0 MENORRHAGIA WITH REGULAR CYCLE: ICD-10-CM

## 2023-04-20 PROCEDURE — 76830 TRANSVAGINAL US NON-OB: CPT

## 2023-05-01 ENCOUNTER — TELEPHONE (OUTPATIENT)
Dept: OBSTETRICS AND GYNECOLOGY | Facility: CLINIC | Age: 28
End: 2023-05-01

## 2023-05-01 NOTE — TELEPHONE ENCOUNTER
Caller: Chelsy Patterson    Relationship to patient: Self    Best call back number: [phone]    Chief complaint: PATIENT HAD ULTRASOUND COMPLETE AND GOT RESULTS THAT THERE IS FIBROIDS - PATIENT STATES AWAITING GENETIC TESTING RESULTS - STATES THAT JENNIFER SMITH HAD HER SCHEDULE A 6 WEEK FOLLOW UP HOWEVER PATIENT IS WANTING TO KNOW IF SHE CAN COME IN SOONER IN CASE THERE IS ANY OTHER TESTING THAT WILL NEED TO BE DONE    Type of visit: GYN FOLLOW UP    Requested date: ASAP    If rescheduling, when is the original appointment: 5/30/23

## 2023-05-11 LAB — REF LAB TEST METHOD: NORMAL

## 2023-05-16 ENCOUNTER — OFFICE VISIT (OUTPATIENT)
Dept: OBSTETRICS AND GYNECOLOGY | Facility: CLINIC | Age: 28
End: 2023-05-16
Payer: COMMERCIAL

## 2023-05-16 VITALS
BODY MASS INDEX: 23.64 KG/M2 | HEART RATE: 77 BPM | SYSTOLIC BLOOD PRESSURE: 100 MMHG | HEIGHT: 60 IN | DIASTOLIC BLOOD PRESSURE: 60 MMHG | WEIGHT: 120.4 LBS

## 2023-05-16 DIAGNOSIS — G89.29 CHRONIC PELVIC PAIN IN FEMALE: Primary | ICD-10-CM

## 2023-05-16 DIAGNOSIS — D25.9 UTERINE LEIOMYOMA, UNSPECIFIED LOCATION: ICD-10-CM

## 2023-05-16 DIAGNOSIS — R10.2 CHRONIC PELVIC PAIN IN FEMALE: Primary | ICD-10-CM

## 2023-05-16 PROBLEM — E28.2 POLYCYSTIC OVARY SYNDROME: Status: ACTIVE | Noted: 2020-02-20

## 2023-05-16 PROCEDURE — 99214 OFFICE O/P EST MOD 30 MIN: CPT | Performed by: OBSTETRICS & GYNECOLOGY

## 2023-05-16 NOTE — PROGRESS NOTES
"GYN Visit    CC: Follow-up ultrasound    HPI:   28 y.o. who presents in follow up for pelvic pain.  The patient was initially evaluated by one of the nurse practitioners who ordered a pelvic ultrasound.  The patient is here to discuss those ultrasound results.  The patient reports having lower abdominal and pelvic pain throughout the month is worse with her menstrual cycle and with certain movements.  The pain is particular worse with bending over and certain positions and lifting.      History: PMHx, Meds, Allergies, PSHx, Social Hx, and POBHx all reviewed and updated.    /60   Pulse 77   Ht 152.4 cm (60\")   Wt 54.6 kg (120 lb 6.4 oz)   LMP 2023 (Within Days)   BMI 23.51 kg/m²     Physical Exam  Vitals and nursing note reviewed. Exam conducted with a chaperone present.   Constitutional:       General: She is not in acute distress.     Appearance: Normal appearance. She is not ill-appearing.   Musculoskeletal:         General: No swelling.      Right lower leg: No edema.      Left lower leg: No edema.   Skin:     General: Skin is warm and dry.      Findings: No rash.   Neurological:      Mental Status: She is alert and oriented to person, place, and time.   Psychiatric:         Mood and Affect: Mood normal.         Behavior: Behavior normal.         Thought Content: Thought content normal.         Judgment: Judgment normal.       Pelvic ultrasound 2023 reviewed    ASSESSMENT AND PLAN:  Diagnoses and all orders for this visit:    1. Chronic pelvic pain in female (Primary)  Assessment & Plan:  Discussed etiology of chronic pelvic pain including both gynecologic and not gynecologic etiologies.  We discussed that overall the patient's ultrasound is reassuring.  There is nothing definitive on the ultrasound that would make me think this is a absolute cause of the patient's pelvic pain.  We discussed that pelvic ultrasound and other types of imaging such as CT and MRI cannot identify all " sources of pelvic pain.  We discussed options for further management including medical therapy with hormonal medication such as birth control pills, which we discussed are very effective at controlling symptoms from many gynecologic causes of pelvic pain including things like endometriosis.  We discussed the option of proceeding with a diagnostic laparoscopy to further evaluate the etiology of the patient's pelvic pain.  We discussed referral back to her primary care physician to evaluate for non gynecologic etiologies of pelvic pain given that there is no clear gynecologic source.  In discussing the patient's symptoms, I do think her symptoms could be most consistent with a pelvic floor injury.  We discussed a referral for pelvic floor therapy to see if this could improve her symptoms.  After spending a great deal of time with the patient and reviewing her options she is still undecided about how she would like to proceed.  She did seem somewhat frustrated that I cannot give her a definitive diagnosis today.  We discussed the difficulties in evaluating pelvic pain and pain in general as there are so many possibilities that could be the source of pain.  We also discussed that imaging studies do not show pain.  They can only show things that potentially could cause pain.  After discussion and answering the patient's questions she did seem satisfied.  She said she would like to take a few days to consider her options and she will let me know how she wishes to proceed.  In the meantime I recommend she continue with OTC NSAIDs to treat her pelvic pain.  If her pain worsens and she is not finding relief then I recommended she return to the office or go to the ER for further evaluation.      2. Uterine leiomyoma, unspecified location  Assessment & Plan:  No description of the quantity or size of the possible fibroids is provided in the pelvic ultrasound.  I have discussed with the patient that unless fibroids are  significantly enlarged causing compressive features of other structures typically they are not associated with significant pelvic pain.  Given the fibroids are not described in their quantity or size I think it is unlikely they are contributing to the patient's symptoms.      I spent greater than 45 minutes with this patient's visit including reviewing the patient's chart and medical records and occluding laboratories and imaging, taking an appropriate history and performing an appropriate physical examination, counseling and discussing treatment options, and documentation    Follow Up:  Return if symptoms worsen or fail to improve.    Jason Frankel MD  05/16/2023

## 2023-05-22 PROBLEM — D25.9 UTERINE LEIOMYOMA: Status: ACTIVE | Noted: 2023-05-22

## 2023-05-22 PROBLEM — R10.2 CHRONIC PELVIC PAIN IN FEMALE: Status: ACTIVE | Noted: 2023-05-22

## 2023-05-22 PROBLEM — G89.29 CHRONIC PELVIC PAIN IN FEMALE: Status: ACTIVE | Noted: 2023-05-22

## 2023-05-22 NOTE — ASSESSMENT & PLAN NOTE
Discussed etiology of chronic pelvic pain including both gynecologic and not gynecologic etiologies.  We discussed that overall the patient's ultrasound is reassuring.  There is nothing definitive on the ultrasound that would make me think this is a absolute cause of the patient's pelvic pain.  We discussed that pelvic ultrasound and other types of imaging such as CT and MRI cannot identify all sources of pelvic pain.  We discussed options for further management including medical therapy with hormonal medication such as birth control pills, which we discussed are very effective at controlling symptoms from many gynecologic causes of pelvic pain including things like endometriosis.  We discussed the option of proceeding with a diagnostic laparoscopy to further evaluate the etiology of the patient's pelvic pain.  We discussed referral back to her primary care physician to evaluate for non gynecologic etiologies of pelvic pain given that there is no clear gynecologic source.  In discussing the patient's symptoms, I do think her symptoms could be most consistent with a pelvic floor injury.  We discussed a referral for pelvic floor therapy to see if this could improve her symptoms.  After spending a great deal of time with the patient and reviewing her options she is still undecided about how she would like to proceed.  She did seem somewhat frustrated that I cannot give her a definitive diagnosis today.  We discussed the difficulties in evaluating pelvic pain and pain in general as there are so many possibilities that could be the source of pain.  We also discussed that imaging studies do not show pain.  They can only show things that potentially could cause pain.  After discussion and answering the patient's questions she did seem satisfied.  She said she would like to take a few days to consider her options and she will let me know how she wishes to proceed.  In the meantime I recommend she continue with OTC NSAIDs to  treat her pelvic pain.  If her pain worsens and she is not finding relief then I recommended she return to the office or go to the ER for further evaluation.

## 2023-05-22 NOTE — ASSESSMENT & PLAN NOTE
No description of the quantity or size of the possible fibroids is provided in the pelvic ultrasound.  I have discussed with the patient that unless fibroids are significantly enlarged causing compressive features of other structures typically they are not associated with significant pelvic pain.  Given the fibroids are not described in their quantity or size I think it is unlikely they are contributing to the patient's symptoms.

## 2023-08-23 ENCOUNTER — HOSPITAL ENCOUNTER (EMERGENCY)
Facility: HOSPITAL | Age: 28
Discharge: HOME | End: 2023-08-23
Attending: EMERGENCY MEDICINE
Payer: COMMERCIAL

## 2023-08-23 ENCOUNTER — APPOINTMENT (OUTPATIENT)
Dept: CT IMAGING | Facility: HOSPITAL | Age: 28
End: 2023-08-23
Payer: COMMERCIAL

## 2023-08-23 VITALS
DIASTOLIC BLOOD PRESSURE: 73 MMHG | OXYGEN SATURATION: 99 % | HEIGHT: 60 IN | BODY MASS INDEX: 22.9 KG/M2 | RESPIRATION RATE: 16 BRPM | HEART RATE: 102 BPM | TEMPERATURE: 100 F | WEIGHT: 116.62 LBS | SYSTOLIC BLOOD PRESSURE: 111 MMHG

## 2023-08-23 DIAGNOSIS — K57.92 DIVERTICULITIS: Primary | ICD-10-CM

## 2023-08-23 LAB
ALBUMIN SERPL-MCNC: 4.4 G/DL (ref 3.5–5.2)
ALBUMIN/GLOB SERPL: 1.5 G/DL
ALP SERPL-CCNC: 90 U/L (ref 39–117)
ALT SERPL W P-5'-P-CCNC: 29 U/L (ref 1–33)
ANION GAP SERPL CALCULATED.3IONS-SCNC: 12 MMOL/L (ref 5–15)
AST SERPL-CCNC: 23 U/L (ref 1–32)
BACTERIA UR QL AUTO: ABNORMAL /HPF
BASOPHILS # BLD AUTO: 0.05 10*3/MM3 (ref 0–0.2)
BASOPHILS NFR BLD AUTO: 0.3 % (ref 0–1.5)
BILIRUB SERPL-MCNC: 0.5 MG/DL (ref 0–1.2)
BILIRUB UR QL STRIP: NEGATIVE
BUN SERPL-MCNC: 4 MG/DL (ref 6–20)
BUN/CREAT SERPL: 5.2 (ref 7–25)
CALCIUM SPEC-SCNC: 8.9 MG/DL (ref 8.6–10.5)
CHLORIDE SERPL-SCNC: 100 MMOL/L (ref 98–107)
CLARITY UR: ABNORMAL
CO2 SERPL-SCNC: 24 MMOL/L (ref 22–29)
COLOR UR: YELLOW
CREAT SERPL-MCNC: 0.77 MG/DL (ref 0.57–1)
DEPRECATED RDW RBC AUTO: 45.5 FL (ref 37–54)
EGFRCR SERPLBLD CKD-EPI 2021: 106.6 ML/MIN/1.73
EOSINOPHIL # BLD AUTO: 0.07 10*3/MM3 (ref 0–0.4)
EOSINOPHIL NFR BLD AUTO: 0.5 % (ref 0.3–6.2)
ERYTHROCYTE [DISTWIDTH] IN BLOOD BY AUTOMATED COUNT: 14.5 % (ref 12.3–15.4)
GLOBULIN UR ELPH-MCNC: 2.9 GM/DL
GLUCOSE SERPL-MCNC: 102 MG/DL (ref 65–99)
GLUCOSE UR STRIP-MCNC: NEGATIVE MG/DL
HCG INTACT+B SERPL-ACNC: <0.5 MIU/ML
HCT VFR BLD AUTO: 39.2 % (ref 34–46.6)
HGB BLD-MCNC: 13.1 G/DL (ref 12–15.9)
HGB UR QL STRIP.AUTO: NEGATIVE
HOLD SPECIMEN: NORMAL
HOLD SPECIMEN: NORMAL
HYALINE CASTS UR QL AUTO: ABNORMAL /LPF
IMM GRANULOCYTES # BLD AUTO: 0.07 10*3/MM3 (ref 0–0.05)
IMM GRANULOCYTES NFR BLD AUTO: 0.5 % (ref 0–0.5)
KETONES UR QL STRIP: NEGATIVE
LEUKOCYTE ESTERASE UR QL STRIP.AUTO: ABNORMAL
LIPASE SERPL-CCNC: 14 U/L (ref 13–60)
LYMPHOCYTES # BLD AUTO: 2 10*3/MM3 (ref 0.7–3.1)
LYMPHOCYTES NFR BLD AUTO: 13.9 % (ref 19.6–45.3)
MCH RBC QN AUTO: 28.7 PG (ref 26.6–33)
MCHC RBC AUTO-ENTMCNC: 33.4 G/DL (ref 31.5–35.7)
MCV RBC AUTO: 86 FL (ref 79–97)
MONOCYTES # BLD AUTO: 1.31 10*3/MM3 (ref 0.1–0.9)
MONOCYTES NFR BLD AUTO: 9.1 % (ref 5–12)
NEUTROPHILS NFR BLD AUTO: 10.89 10*3/MM3 (ref 1.7–7)
NEUTROPHILS NFR BLD AUTO: 75.7 % (ref 42.7–76)
NITRITE UR QL STRIP: NEGATIVE
NRBC BLD AUTO-RTO: 0 /100 WBC (ref 0–0.2)
PH UR STRIP.AUTO: 6 [PH] (ref 5–8)
PLATELET # BLD AUTO: 210 10*3/MM3 (ref 140–450)
PMV BLD AUTO: 11.1 FL (ref 6–12)
POTASSIUM SERPL-SCNC: 3.4 MMOL/L (ref 3.5–5.2)
PROT SERPL-MCNC: 7.3 G/DL (ref 6–8.5)
PROT UR QL STRIP: NEGATIVE
RBC # BLD AUTO: 4.56 10*6/MM3 (ref 3.77–5.28)
RBC # UR STRIP: ABNORMAL /HPF
REF LAB TEST METHOD: ABNORMAL
SODIUM SERPL-SCNC: 136 MMOL/L (ref 136–145)
SP GR UR STRIP: 1.01 (ref 1–1.03)
SQUAMOUS #/AREA URNS HPF: ABNORMAL /HPF
UROBILINOGEN UR QL STRIP: ABNORMAL
WBC # UR STRIP: ABNORMAL /HPF
WBC NRBC COR # BLD: 14.39 10*3/MM3 (ref 3.4–10.8)
WHOLE BLOOD HOLD COAG: NORMAL
WHOLE BLOOD HOLD SPECIMEN: NORMAL

## 2023-08-23 PROCEDURE — 99284 EMERGENCY DEPT VISIT MOD MDM: CPT

## 2023-08-23 PROCEDURE — 83690 ASSAY OF LIPASE: CPT

## 2023-08-23 PROCEDURE — 85025 COMPLETE CBC W/AUTO DIFF WBC: CPT

## 2023-08-23 PROCEDURE — 36415 COLL VENOUS BLD VENIPUNCTURE: CPT

## 2023-08-23 PROCEDURE — 96361 HYDRATE IV INFUSION ADD-ON: CPT

## 2023-08-23 PROCEDURE — 74176 CT ABD & PELVIS W/O CONTRAST: CPT

## 2023-08-23 PROCEDURE — 96374 THER/PROPH/DIAG INJ IV PUSH: CPT

## 2023-08-23 PROCEDURE — 80053 COMPREHEN METABOLIC PANEL: CPT

## 2023-08-23 PROCEDURE — 81001 URINALYSIS AUTO W/SCOPE: CPT | Performed by: EMERGENCY MEDICINE

## 2023-08-23 PROCEDURE — 25010000002 KETOROLAC TROMETHAMINE PER 15 MG: Performed by: NURSE PRACTITIONER

## 2023-08-23 PROCEDURE — 84702 CHORIONIC GONADOTROPIN TEST: CPT

## 2023-08-23 RX ORDER — SODIUM CHLORIDE 0.9 % (FLUSH) 0.9 %
10 SYRINGE (ML) INJECTION AS NEEDED
Status: DISCONTINUED | OUTPATIENT
Start: 2023-08-23 | End: 2023-08-23 | Stop reason: HOSPADM

## 2023-08-23 RX ORDER — KETOROLAC TROMETHAMINE 30 MG/ML
30 INJECTION, SOLUTION INTRAMUSCULAR; INTRAVENOUS ONCE
Status: COMPLETED | OUTPATIENT
Start: 2023-08-23 | End: 2023-08-23

## 2023-08-23 RX ORDER — AMOXICILLIN AND CLAVULANATE POTASSIUM 875; 125 MG/1; MG/1
1 TABLET, FILM COATED ORAL ONCE
Status: COMPLETED | OUTPATIENT
Start: 2023-08-23 | End: 2023-08-23

## 2023-08-23 RX ORDER — ONDANSETRON 4 MG/1
4 TABLET, ORALLY DISINTEGRATING ORAL 4 TIMES DAILY PRN
Qty: 15 TABLET | Refills: 0 | Status: SHIPPED | OUTPATIENT
Start: 2023-08-23

## 2023-08-23 RX ORDER — AMOXICILLIN AND CLAVULANATE POTASSIUM 875; 125 MG/1; MG/1
1 TABLET, FILM COATED ORAL 2 TIMES DAILY
Qty: 14 TABLET | Refills: 0 | Status: SHIPPED | OUTPATIENT
Start: 2023-08-23 | End: 2023-08-30

## 2023-08-23 RX ORDER — DICYCLOMINE HCL 20 MG
20 TABLET ORAL EVERY 6 HOURS PRN
Qty: 30 TABLET | Refills: 0 | Status: SHIPPED | OUTPATIENT
Start: 2023-08-23

## 2023-08-23 RX ADMIN — SODIUM CHLORIDE 1000 ML: 9 INJECTION, SOLUTION INTRAVENOUS at 20:17

## 2023-08-23 RX ADMIN — AMOXICILLIN AND CLAVULANATE POTASSIUM 1 TABLET: 875; 125 TABLET, FILM COATED ORAL at 20:49

## 2023-08-23 RX ADMIN — KETOROLAC TROMETHAMINE 30 MG: 30 INJECTION, SOLUTION INTRAMUSCULAR; INTRAVENOUS at 20:15

## 2023-08-23 NOTE — ED NOTES
Pt states she has had abdominal pain x 5 days, worse today, states the pain is in the upper left quadrant, states her abdomen is swollen more than normal

## 2023-08-23 NOTE — ED PROVIDER NOTES
Time: 6:38 PM EDT  Date of encounter:  8/23/2023  Independent Historian/Clinical History and Information was obtained by:   Patient    History is limited by: N/A    Chief Complaint   Patient presents with    Abdominal Pain         History of Present Illness:  Patient is a 28 y.o. year old female who presents to the emergency department for evaluation of left upper quadrant abdominal pain and left flank pain for the past 5 days.  Patient states she has had a low-grade fever at home.  Patient has no nausea vomiting or diarrhea patient denies urinary sx. Patient was seen by provider in triage, Toribio Garzon PA-C      [facility]    Patient Care Team  Primary Care Provider: Provider, No Known    Past Medical History:     No Known Allergies  Past Medical History:   Diagnosis Date    ADHD     Anemia     Anxiety     Chlamydia     Fibroid     Gonorrhea     PCOS (polycystic ovarian syndrome)     Polycystic ovary syndrome     Trauma      Past Surgical History:   Procedure Laterality Date    SALPINGECTOMY       Family History   Adopted: Yes   Problem Relation Age of Onset    Breast cancer Maternal Grandmother     Colon cancer Neg Hx     Melanoma Neg Hx     Pulmonary embolism Neg Hx     Deep vein thrombosis Neg Hx     Uterine cancer Neg Hx     Ovarian cancer Neg Hx        Home Medications:  Prior to Admission medications    Medication Sig Start Date End Date Taking? Authorizing Provider   Adderall XR 30 MG 24 hr capsule Take 1 capsule by mouth Daily 1/28/22   Emergency, Nurse Rupinder, RN        Social History:   Social History     Tobacco Use    Smoking status: Every Day     Packs/day: 0.50     Types: Cigarettes    Smokeless tobacco: Never   Vaping Use    Vaping Use: Some days    Substances: Nicotine    Devices: Disposable   Substance Use Topics    Alcohol use: Yes     Comment: rarely    Drug use: Never         Review of Systems:  Review of Systems   Constitutional:  Positive for fever (100). Negative for chills.   HENT:  Negative for  "congestion, ear pain and sore throat.    Eyes:  Negative for pain.   Respiratory:  Negative for cough, chest tightness and shortness of breath.    Cardiovascular:  Negative for chest pain.   Gastrointestinal:  Positive for abdominal pain. Negative for diarrhea, nausea and vomiting.   Genitourinary:  Positive for flank pain. Negative for dysuria and hematuria.   Musculoskeletal:  Negative for joint swelling.   Skin:  Negative for pallor.   Neurological:  Negative for seizures and headaches.   Hematological: Negative.    Psychiatric/Behavioral: Negative.     All other systems reviewed and are negative.     Physical Exam:  /73   Pulse 102   Temp 100 øF (37.8 øC) (Oral)   Resp 16   Ht 152.4 cm (60\")   Wt 52.9 kg (116 lb 10 oz)   SpO2 99%   BMI 22.78 kg/mý         Physical Exam  Vitals and nursing note reviewed.   Constitutional:       General: She is not in acute distress.     Appearance: Normal appearance. She is not toxic-appearing.   HENT:      Head: Normocephalic and atraumatic.      Mouth/Throat:      Mouth: Mucous membranes are moist.   Eyes:      General: No scleral icterus.     Pupils: Pupils are equal, round, and reactive to light.   Cardiovascular:      Rate and Rhythm: Regular rhythm. Tachycardia present.      Pulses: Normal pulses.      Heart sounds: Normal heart sounds.   Pulmonary:      Effort: Pulmonary effort is normal. No respiratory distress.      Breath sounds: Normal breath sounds.   Abdominal:      General: Abdomen is flat. Bowel sounds are normal. There is no distension.      Palpations: Abdomen is soft.      Tenderness: There is abdominal tenderness in the left upper quadrant. There is left CVA tenderness.   Musculoskeletal:         General: Normal range of motion.      Cervical back: Normal range of motion and neck supple.   Skin:     General: Skin is warm and dry.   Neurological:      General: No focal deficit present.      Mental Status: She is alert and oriented to person, place, " and time. Mental status is at baseline.   Psychiatric:         Mood and Affect: Mood normal.         Behavior: Behavior normal.            Medical Decision Making:      Comorbidities that affect care:    PCOS, Smoking    External Notes reviewed:    Previous Clinic Note: Patient's last clinic visit was with her OB/GYN Dr. Hussein for subserous leiomyoma on July 11      The following orders were placed and all results were independently analyzed by me:  Orders Placed This Encounter   Procedures    CT Abdomen Pelvis Without Contrast    [place] Draw    Comprehensive Metabolic Panel    Lipase    Urinalysis With Microscopic If Indicated (No Culture) - Urine, Clean Catch    hCG, Quantitative, Pregnancy    CBC Auto Differential    Urinalysis, Microscopic Only - Urine, Clean Catch    CBC & Differential    Green Top (Gel)    Lavender Top    Gold Top - SST    Light Blue Top       Medications Given in the Emergency Department:  Medications   ketorolac (TORADOL) injection 30 mg (30 mg Intravenous Given 8/23/23 2015)   sodium chloride 0.9 % bolus 1,000 mL (0 mL Intravenous Stopped 8/23/23 2049)   amoxicillin-clavulanate (AUGMENTIN) 875-125 MG per tablet 1 tablet (1 tablet Oral Given 8/23/23 2049)        ED Course:    The patient was initially evaluated in the triage area where orders were placed. The patient was later dispositioned by JENNIFER Huang.      The patient was advised to stay for completion of workup which includes but is not limited to communication of labs and radiological results, reassessment and plan. The patient was advised that leaving prior to disposition by a provider could result in critical findings that are not communicated to the patient.     ED Course as of 08/24/23 0605   Wed Aug 23, 2023   1839 Provider In Triage: Patient was evaluated by me in triage, Toribio Garzon PA-C. Orders were placed and the patient is currently awaiting final results and disposition.   [SK]   2018 CT Abdomen Pelvis Without  Contrast  Colitis or diverticulitis [DS]      ED Course User Index  [DS] Laurel Mayfield APRN  [SK] Toribio Garzon PA-C       Labs:    Lab Results (last 24 hours)       Procedure Component Value Units Date/Time    CBC & Differential [[id number]]  (Abnormal) Collected: 08/23/23 1840    Specimen: Blood Updated: 08/23/23 1853    Narrative:      The following orders were created for panel order CBC & Differential.  Procedure                               Abnormality         Status                     ---------                               -----------         ------                     CBC Auto Differential[[id number]]        Abnormal            Final result                 Please view results for these tests on the individual orders.    Comprehensive Metabolic Panel [[id number]]  (Abnormal) Collected: 08/23/23 1840    Specimen: Blood Updated: 08/23/23 1912     Glucose 102 mg/dL      BUN 4 mg/dL      Creatinine 0.77 mg/dL      Sodium 136 mmol/L      Potassium 3.4 mmol/L      Chloride 100 mmol/L      CO2 24.0 mmol/L      Calcium 8.9 mg/dL      Total Protein 7.3 g/dL      Albumin 4.4 g/dL      ALT (SGPT) 29 U/L      AST (SGOT) 23 U/L      Alkaline Phosphatase 90 U/L      Total Bilirubin 0.5 mg/dL      Globulin 2.9 gm/dL      A/G Ratio 1.5 g/dL      BUN/Creatinine Ratio 5.2     Anion Gap 12.0 mmol/L      eGFR 106.6 mL/min/1.73     Narrative:      GFR Normal >60  Chronic Kidney Disease <60  Kidney Failure <15      Lipase [[id number]]  (Normal) Collected: 08/23/23 1840    Specimen: Blood Updated: 08/23/23 1912     Lipase 14 U/L     hCG, Quantitative, Pregnancy [[id number]] Collected: 08/23/23 1840    Specimen: Blood Updated: 08/23/23 1915     HCG Quantitative <0.50 mIU/mL     Narrative:      HCG Ranges by Gestational Age    Females - non-pregnant premenopausal   </= 1mIU/mL HCG  Females - postmenopausal               </= 7mIU/mL HCG    3 Weeks       5.4   -      72 mIU/mL  4 Weeks      10.2   -     708 mIU/mL  5 Weeks       217    -   8,245 mIU/mL  6 Weeks       152   -  32,177 mIU/mL  7 Weeks     4,059   - 153,767 mIU/mL  8 Weeks    31,366   - 149,094 mIU/mL  9 Weeks    59,109   - 135,901 mIU/mL  10 Weeks   44,186   - 170,409 mIU/mL  12 Weeks   27,107   - 201,615 mIU/mL  14 Weeks   24,302   -  93,646 mIU/mL  15 Weeks   12,540   -  69,747 mIU/mL  16 Weeks    8,904   -  55,332 mIU/mL  17 Weeks    8,240   -  51,793 mIU/mL  18 Weeks    9,649   -  55,271 mIU/mL      CBC Auto Differential [[id number]]  (Abnormal) Collected: 08/23/23 1840    Specimen: Blood Updated: 08/23/23 1853     WBC 14.39 10*3/mm3      RBC 4.56 10*6/mm3      Hemoglobin 13.1 g/dL      Hematocrit 39.2 %      MCV 86.0 fL      MCH 28.7 pg      MCHC 33.4 g/dL      RDW 14.5 %      RDW-SD 45.5 fl      MPV 11.1 fL      Platelets 210 10*3/mm3      Neutrophil % 75.7 %      Lymphocyte % 13.9 %      Monocyte % 9.1 %      Eosinophil % 0.5 %      Basophil % 0.3 %      Immature Grans % 0.5 %      Neutrophils, Absolute 10.89 10*3/mm3      Lymphocytes, Absolute 2.00 10*3/mm3      Monocytes, Absolute 1.31 10*3/mm3      Eosinophils, Absolute 0.07 10*3/mm3      Basophils, Absolute 0.05 10*3/mm3      Immature Grans, Absolute 0.07 10*3/mm3      nRBC 0.0 /100 WBC     Urinalysis With Microscopic If Indicated (No Culture) - Urine, Clean Catch [[id number]]  (Abnormal) Collected: 08/23/23 1901    Specimen: Urine, Clean Catch Updated: 08/23/23 1937     Color, UA Yellow     Appearance, UA Cloudy     pH, UA 6.0     Specific Gravity, UA 1.007     Glucose, UA Negative     Ketones, UA Negative     Bilirubin, UA Negative     Blood, UA Negative     Protein, UA Negative     Leuk Esterase, UA Small (1+)     Nitrite, UA Negative     Urobilinogen, UA 0.2 E.U./dL    Urinalysis, Microscopic Only - Urine, Clean Catch [[id number]]  (Abnormal) Collected: 08/23/23 1901    Specimen: Urine, Clean Catch Updated: 08/23/23 1946     RBC, UA None Seen /HPF      WBC, UA 0-2 /HPF      Bacteria, UA Trace /HPF      Squamous Epithelial  Cells, UA 0-2 /HPF      Hyaline Casts, UA None Seen /LPF      Methodology Manual Light Microscopy             Imaging:    CT Abdomen Pelvis Without Contrast    Result Date: 8/23/2023  PROCEDURE: CT ABDOMEN PELVIS WO CONTRAST  COMPARISON: None  INDICATIONS: Flank pain, kidney stone suspected  TECHNIQUE: CT images were created without intravenous contrast.   PROTOCOL:   Standard imaging protocol performed    RADIATION:   DLP: 283.9mGy*cm   Automated exposure control was utilized to minimize radiation dose.  FINDINGS:    Lung bases:  Limited imaging lung bases is grossly clear.  No free air is noted below the diaphragm.  Organs:  Limited noncontrast imaging of the kidneys demonstrates no evidence of renal calculi.  There is no hydronephrosis.  No definite calculi along the expected course of the ureters.  Limited noncontrast imaging of the liver, gallbladder, pancreas, spleen and adrenal glands appear grossly unremarkable  GI tract:  Limited noncontrast imaging of the stomach and small bowel demonstrate no acute abnormality.  The appendix is visualized and appears normal.  The ileocecal valve is unremarkable.  Wall thickening and inflammatory changes of the descending colon noted no definite perforation or abscess formation on limited noncontrast imaging.  Lymph nodes within the mesentery are likely reactive.  Pelvis:  Uterus is grossly unremarkable in appearance.  Ovaries are not well visualized.  There is a small amount of free fluid within the pelvis, nonspecific.  The urinary bladder is incompletely distended.  Numerous phleboliths are noted within the pelvis.  Retroperitoneum:  Limited noncontrast imaging of the aorta is normal in caliber.  Small lymph nodes in the retroperitoneum are likely reactive.  Bones and soft tissues:  No acute osseous abnormality        1. Inflammatory changes of the descending colon compatible with colitis or diverticulitis.  No definite perforation or abscess formation noted on limited  noncontrast imaging.     JOSE ROBERTO HALL MD       Electronically Signed and Approved By: JOSE ROBERTO HALL MD on 8/23/2023 at 20:10                Differential Diagnosis and Discussion:      Abdominal Pain: Based on the patient's signs and symptoms, I considered abdominal aortic aneurysm, small bowel obstruction, pancreatitis, acute cholecystitis, acute appendecitis, peptic ulcer disease, gastritis, colitis, endocrine disorders, irritable bowel syndrome and other differential diagnosis an etiology of the patient's abdominal pain.    All labs were reviewed and interpreted by me.  CT scan radiology impression was interpreted by me.    MDM  Number of Diagnoses or Management Options  Diverticulitis  Diagnosis management comments: The patient is resting comfortably and feels better, is alert and in no distress. Repeat examination is unremarkable and benign; in particular, there's no discomfort at McBurney's point and there is no pulsatile mass. The history, exam, diagnostic testing, and current condition does not suggest acute appendicitis, bowel obstruction, acute cholecystitis, bowel perforation, major gastrointestinal bleeding, severe diverticulitis, abdominal aortic aneurysm, mesenteric ischemia, volvulus, sepsis, or other significant pathology that warrants further testing, continued ED treatment, admission, for surgical evaluation at this point. The vital signs have been stable. The patient does not have uncontrollable pain, intractable vomiting, or other significant symptoms. The patient's condition is stable and appropriate for discharge from the emergency department.          Amount and/or Complexity of Data Reviewed  Clinical lab tests: reviewed and ordered  Tests in the radiology section of CPTr: ordered and reviewed  Tests in the medicine section of CPTr: ordered and reviewed    Risk of Complications, Morbidity, and/or Mortality  Presenting problems: moderate  Diagnostic procedures: moderate  Management  options: low    Patient Progress  Patient progress: stable       Patient Care Considerations:    CONSULT: I considered consulting gastroenterology, however no acute emergent complications.  Findings of uncomplicated colitis and diverticulitis warrant outpatient follow-up after treatment      Consultants/Shared Management Plan:    None    Social Determinants of Health:    Patient is independent, reliable, and has access to care.       Disposition and Care Coordination:    Discharged: I considered escalation of care by admitting this patient for observation, however the patient has improved and is suitable and  stable for discharge.    I have explained the patient's condition, diagnoses and treatment plan based on the information available to me at this time. I have answered questions and addressed any concerns. The patient has a good  understanding of the patient's diagnosis, condition, and treatment plan as can be expected at this point. The vital signs have been stable. The patient's condition is stable and appropriate for discharge from the emergency department.      The patient will pursue further outpatient evaluation with the primary care physician or other designated or consulting physician as outlined in the discharge instructions. They are agreeable to this plan of care and follow-up instructions have been explained in detail. The patient has received these instructions in written format and have expressed an understanding of the discharge instructions. The patient is aware that any significant change in condition or worsening of symptoms should prompt an immediate return to this or the closest emergency department or call to 911.  I have explained discharge medications and the need for follow up with the patient/caretakers. This was also printed in the discharge instructions. Patient was discharged with the following medications and follow up:      Medication List        New Prescriptions       amoxicillin-clavulanate 875-125 MG per tablet  Commonly known as: AUGMENTIN  Take 1 tablet by mouth 2 (Two) Times a Day for 7 days.     dicyclomine 20 MG tablet  Commonly known as: BENTYL  Take 1 tablet by mouth Every 6 (Six) Hours As Needed for Abdominal Cramping.     ondansetron ODT 4 MG disintegrating tablet  Commonly known as: ZOFRAN-ODT  Place 1 tablet on the tongue 4 (Four) Times a Day As Needed for Nausea or Vomiting.               Where to Get Your Medications        These medications were sent to [facility]/pharmacy #[id number] - [place], [place] - [address] - [phone]  - [phone]   [address] [place] [address]      Hours: 24-hours Phone: [phone]   amoxicillin-clavulanate 875-125 MG per tablet  dicyclomine 20 MG tablet  ondansetron ODT 4 MG disintegrating tablet      Joana Rose MD  [address] [place], [place] [address] [address]  [phone]    Schedule an appointment as soon as possible for a visit   Reevaluation       Final diagnoses:   Diverticulitis        ED Disposition       ED Disposition   Discharge    Condition   Stable    Comment   --               This medical record created using voice recognition software.             Laurel Mayfield, JENNIFER  08/24/23 0605

## 2023-08-23 NOTE — Clinical Note
[facility] EMERGENCY ROOM  [address]  [place] [address]  Phone: [phone]    Chelsy Patterson was seen and treated in our emergency department on 8/23/2023.  She may return to work on 08/25/2023.         Thank you for choosing [facility].    Laurel Mayfield APRN

## 2023-08-24 NOTE — DISCHARGE INSTRUCTIONS
Your CT scan shows evidence of inflammation of the bowel which would be consistent with colitis or even may be diverticulitis.    This requires antibiotic for treatment.    Take medicine as prescribed until finished.    Follow-up with GI for reevaluation and the possible need for future colonoscopy to evaluate

## 2023-09-22 RX ORDER — DEXTROAMPHETAMINE SACCHARATE, AMPHETAMINE ASPARTATE, DEXTROAMPHETAMINE SULFATE AND AMPHETAMINE SULFATE 5; 5; 5; 5 MG/1; MG/1; MG/1; MG/1
20 TABLET ORAL
COMMUNITY
Start: 2023-08-16

## 2023-09-26 ENCOUNTER — ANESTHESIA EVENT (OUTPATIENT)
Dept: PERIOP | Facility: HOSPITAL | Age: 28
End: 2023-09-26
Payer: COMMERCIAL

## 2023-09-26 ENCOUNTER — ANESTHESIA (OUTPATIENT)
Dept: PERIOP | Facility: HOSPITAL | Age: 28
End: 2023-09-26
Payer: COMMERCIAL

## 2023-09-26 ENCOUNTER — HOSPITAL ENCOUNTER (OUTPATIENT)
Facility: HOSPITAL | Age: 28
Discharge: HOME | End: 2023-09-27
Attending: OBSTETRICS & GYNECOLOGY | Admitting: OBSTETRICS & GYNECOLOGY
Payer: COMMERCIAL

## 2023-09-26 DIAGNOSIS — Z98.890 POST-OPERATIVE STATE: Primary | ICD-10-CM

## 2023-09-26 DIAGNOSIS — D25.2 LEIOMYOMA, SUBSEROUS: ICD-10-CM

## 2023-09-26 PROBLEM — D25.9 UTERINE LEIOMYOMA: Status: RESOLVED | Noted: 2023-05-22 | Resolved: 2023-09-26

## 2023-09-26 LAB
ALBUMIN SERPL-MCNC: 3.9 G/DL (ref 3.5–5.2)
ALBUMIN/GLOB SERPL: 2 G/DL
ALP SERPL-CCNC: 61 U/L (ref 39–117)
ALT SERPL W P-5'-P-CCNC: 12 U/L (ref 1–33)
ANION GAP SERPL CALCULATED.3IONS-SCNC: 8.1 MMOL/L (ref 5–15)
AST SERPL-CCNC: 17 U/L (ref 1–32)
B-HCG UR QL: NEGATIVE
BASOPHILS # BLD AUTO: 0.01 10*3/MM3 (ref 0–0.2)
BASOPHILS NFR BLD AUTO: 0.1 % (ref 0–1.5)
BILIRUB SERPL-MCNC: 0.5 MG/DL (ref 0–1.2)
BUN SERPL-MCNC: 4 MG/DL (ref 6–20)
BUN/CREAT SERPL: 5.7 (ref 7–25)
CALCIUM SPEC-SCNC: 8.5 MG/DL (ref 8.6–10.5)
CHLORIDE SERPL-SCNC: 103 MMOL/L (ref 98–107)
CO2 SERPL-SCNC: 23.9 MMOL/L (ref 22–29)
CREAT SERPL-MCNC: 0.7 MG/DL (ref 0.57–1)
DEPRECATED RDW RBC AUTO: 43.8 FL (ref 37–54)
EGFRCR SERPLBLD CKD-EPI 2021: 119.5 ML/MIN/1.73
EOSINOPHIL # BLD AUTO: 0 10*3/MM3 (ref 0–0.4)
EOSINOPHIL NFR BLD AUTO: 0 % (ref 0.3–6.2)
ERYTHROCYTE [DISTWIDTH] IN BLOOD BY AUTOMATED COUNT: 13.8 % (ref 12.3–15.4)
GLOBULIN UR ELPH-MCNC: 2 GM/DL
GLUCOSE SERPL-MCNC: 178 MG/DL (ref 65–99)
HCT VFR BLD AUTO: 35.2 % (ref 34–46.6)
HGB BLD-MCNC: 11.2 G/DL (ref 12–15.9)
IMM GRANULOCYTES # BLD AUTO: 0.02 10*3/MM3 (ref 0–0.05)
IMM GRANULOCYTES NFR BLD AUTO: 0.3 % (ref 0–0.5)
LYMPHOCYTES # BLD AUTO: 0.6 10*3/MM3 (ref 0.7–3.1)
LYMPHOCYTES NFR BLD AUTO: 8 % (ref 19.6–45.3)
MAGNESIUM SERPL-MCNC: 1.9 MG/DL (ref 1.6–2.6)
MCH RBC QN AUTO: 27.5 PG (ref 26.6–33)
MCHC RBC AUTO-ENTMCNC: 31.8 G/DL (ref 31.5–35.7)
MCV RBC AUTO: 86.3 FL (ref 79–97)
MONOCYTES # BLD AUTO: 0.31 10*3/MM3 (ref 0.1–0.9)
MONOCYTES NFR BLD AUTO: 4.1 % (ref 5–12)
NEUTROPHILS NFR BLD AUTO: 6.55 10*3/MM3 (ref 1.7–7)
NEUTROPHILS NFR BLD AUTO: 87.5 % (ref 42.7–76)
NRBC BLD AUTO-RTO: 0 /100 WBC (ref 0–0.2)
PLATELET # BLD AUTO: 177 10*3/MM3 (ref 140–450)
PMV BLD AUTO: 11.3 FL (ref 6–12)
POTASSIUM SERPL-SCNC: 4.7 MMOL/L (ref 3.5–5.2)
PROT SERPL-MCNC: 5.9 G/DL (ref 6–8.5)
QT INTERVAL: 473 MS
QTC INTERVAL: 412 MS
RBC # BLD AUTO: 4.08 10*6/MM3 (ref 3.77–5.28)
SODIUM SERPL-SCNC: 135 MMOL/L (ref 136–145)
TSH SERPL DL<=0.05 MIU/L-ACNC: 0.74 UIU/ML (ref 0.27–4.2)
WBC NRBC COR # BLD: 7.49 10*3/MM3 (ref 3.4–10.8)

## 2023-09-26 PROCEDURE — 25010000002 PROPOFOL 10 MG/ML EMULSION: Performed by: NURSE ANESTHETIST, CERTIFIED REGISTERED

## 2023-09-26 PROCEDURE — 83735 ASSAY OF MAGNESIUM: CPT | Performed by: PHYSICIAN ASSISTANT

## 2023-09-26 PROCEDURE — 93010 ELECTROCARDIOGRAM REPORT: CPT | Performed by: INTERNAL MEDICINE

## 2023-09-26 PROCEDURE — 93005 ELECTROCARDIOGRAM TRACING: CPT | Performed by: OBSTETRICS & GYNECOLOGY

## 2023-09-26 PROCEDURE — 25010000002 SUGAMMADEX 200 MG/2ML SOLUTION: Performed by: NURSE ANESTHETIST, CERTIFIED REGISTERED

## 2023-09-26 PROCEDURE — 25010000002 HYDROMORPHONE 1 MG/ML SOLUTION: Performed by: NURSE ANESTHETIST, CERTIFIED REGISTERED

## 2023-09-26 PROCEDURE — 25010000002 ONDANSETRON PER 1 MG: Performed by: NURSE ANESTHETIST, CERTIFIED REGISTERED

## 2023-09-26 PROCEDURE — 25010000002 KETOROLAC TROMETHAMINE PER 15 MG: Performed by: NURSE ANESTHETIST, CERTIFIED REGISTERED

## 2023-09-26 PROCEDURE — 25010000002 KETOROLAC TROMETHAMINE PER 15 MG: Performed by: PHYSICIAN ASSISTANT

## 2023-09-26 PROCEDURE — 88307 TISSUE EXAM BY PATHOLOGIST: CPT | Performed by: OBSTETRICS & GYNECOLOGY

## 2023-09-26 PROCEDURE — 0 HYDROMORPHONE 1 MG/ML SOLUTION: Performed by: NURSE ANESTHETIST, CERTIFIED REGISTERED

## 2023-09-26 PROCEDURE — 25010000002 KETOROLAC TROMETHAMINE PER 15 MG: Performed by: OBSTETRICS & GYNECOLOGY

## 2023-09-26 PROCEDURE — 25010000002 CEFAZOLIN IN DEXTROSE 2000 MG/ 100 ML SOLUTION: Performed by: OBSTETRICS & GYNECOLOGY

## 2023-09-26 PROCEDURE — 25010000002 MAGNESIUM SULFATE PER 500 MG OF MAGNESIUM: Performed by: NURSE ANESTHETIST, CERTIFIED REGISTERED

## 2023-09-26 PROCEDURE — 25010000002 MIDAZOLAM PER 1MG: Performed by: ANESTHESIOLOGY

## 2023-09-26 PROCEDURE — 0 MEPERIDINE PER 100 MG: Performed by: ANESTHESIOLOGY

## 2023-09-26 PROCEDURE — 25010000002 BUPIVACAINE (PF) 0.25 % SOLUTION: Performed by: OBSTETRICS & GYNECOLOGY

## 2023-09-26 PROCEDURE — 25010000002 FENTANYL CITRATE (PF) 50 MCG/ML SOLUTION: Performed by: NURSE ANESTHETIST, CERTIFIED REGISTERED

## 2023-09-26 PROCEDURE — 25010000002 DEXAMETHASONE PER 1 MG: Performed by: NURSE ANESTHETIST, CERTIFIED REGISTERED

## 2023-09-26 PROCEDURE — 81025 URINE PREGNANCY TEST: CPT | Performed by: ANESTHESIOLOGY

## 2023-09-26 PROCEDURE — 93005 ELECTROCARDIOGRAM TRACING: CPT | Performed by: PHYSICIAN ASSISTANT

## 2023-09-26 PROCEDURE — 80050 GENERAL HEALTH PANEL: CPT | Performed by: PHYSICIAN ASSISTANT

## 2023-09-26 DEVICE — ABSORBABLE WOUND CLOSURE DEVICE
Type: IMPLANTABLE DEVICE | Site: ABDOMEN | Status: FUNCTIONAL
Brand: V-LOC 180

## 2023-09-26 DEVICE — ABSORBABLE HEMOSTAT (OXIDIZED REGENERATED CELLULOSE, U.S.P.)
Type: IMPLANTABLE DEVICE | Site: ABDOMEN | Status: FUNCTIONAL
Brand: SURGICEL

## 2023-09-26 RX ORDER — OXYCODONE HYDROCHLORIDE 5 MG/1
10 TABLET ORAL EVERY 6 HOURS PRN
Status: DISCONTINUED | OUTPATIENT
Start: 2023-09-26 | End: 2023-09-27 | Stop reason: HOSPADM

## 2023-09-26 RX ORDER — SODIUM CHLORIDE 9 MG/ML
40 INJECTION, SOLUTION INTRAVENOUS AS NEEDED
Status: DISCONTINUED | OUTPATIENT
Start: 2023-09-26 | End: 2023-09-26 | Stop reason: HOSPADM

## 2023-09-26 RX ORDER — ROCURONIUM BROMIDE 10 MG/ML
INJECTION, SOLUTION INTRAVENOUS AS NEEDED
Status: DISCONTINUED | OUTPATIENT
Start: 2023-09-26 | End: 2023-09-26 | Stop reason: SURG

## 2023-09-26 RX ORDER — OXYCODONE HYDROCHLORIDE 5 MG/1
5 TABLET ORAL EVERY 4 HOURS PRN
Qty: 12 TABLET | Refills: 0 | Status: SHIPPED | OUTPATIENT
Start: 2023-09-26

## 2023-09-26 RX ORDER — LIDOCAINE HYDROCHLORIDE 20 MG/ML
INJECTION, SOLUTION EPIDURAL; INFILTRATION; INTRACAUDAL; PERINEURAL AS NEEDED
Status: DISCONTINUED | OUTPATIENT
Start: 2023-09-26 | End: 2023-09-26 | Stop reason: SURG

## 2023-09-26 RX ORDER — MIDAZOLAM HYDROCHLORIDE 2 MG/2ML
2 INJECTION, SOLUTION INTRAMUSCULAR; INTRAVENOUS ONCE
Status: COMPLETED | OUTPATIENT
Start: 2023-09-26 | End: 2023-09-26

## 2023-09-26 RX ORDER — SCOLOPAMINE TRANSDERMAL SYSTEM 1 MG/1
PATCH, EXTENDED RELEASE TRANSDERMAL AS NEEDED
Status: DISCONTINUED | OUTPATIENT
Start: 2023-09-26 | End: 2023-09-26 | Stop reason: SURG

## 2023-09-26 RX ORDER — CEFAZOLIN SODIUM 2 G/100ML
2000 INJECTION, SOLUTION INTRAVENOUS ONCE
Status: COMPLETED | OUTPATIENT
Start: 2023-09-26 | End: 2023-09-26

## 2023-09-26 RX ORDER — ACETAMINOPHEN 325 MG/1
650 TABLET ORAL EVERY 6 HOURS
Status: DISCONTINUED | OUTPATIENT
Start: 2023-09-26 | End: 2023-09-27

## 2023-09-26 RX ORDER — FENTANYL CITRATE 50 UG/ML
INJECTION, SOLUTION INTRAMUSCULAR; INTRAVENOUS AS NEEDED
Status: DISCONTINUED | OUTPATIENT
Start: 2023-09-26 | End: 2023-09-26 | Stop reason: SURG

## 2023-09-26 RX ORDER — ALUMINA, MAGNESIA, AND SIMETHICONE 2400; 2400; 240 MG/30ML; MG/30ML; MG/30ML
30 SUSPENSION ORAL EVERY 6 HOURS PRN
Status: DISCONTINUED | OUTPATIENT
Start: 2023-09-26 | End: 2023-09-27 | Stop reason: HOSPADM

## 2023-09-26 RX ORDER — ACETAMINOPHEN 500 MG
1000 TABLET ORAL ONCE
Status: COMPLETED | OUTPATIENT
Start: 2023-09-26 | End: 2023-09-26

## 2023-09-26 RX ORDER — IBUPROFEN 600 MG/1
600 TABLET ORAL EVERY 6 HOURS PRN
Qty: 20 TABLET | Refills: 0 | Status: SHIPPED | OUTPATIENT
Start: 2023-09-26 | End: 2023-10-01

## 2023-09-26 RX ORDER — DOCUSATE SODIUM 100 MG/1
100 CAPSULE, LIQUID FILLED ORAL DAILY PRN
Qty: 14 CAPSULE | Refills: 0 | Status: SHIPPED | OUTPATIENT
Start: 2023-09-26

## 2023-09-26 RX ORDER — SUCCINYLCHOLINE/SOD CL,ISO/PF 100 MG/5ML
SYRINGE (ML) INTRAVENOUS AS NEEDED
Status: DISCONTINUED | OUTPATIENT
Start: 2023-09-26 | End: 2023-09-26 | Stop reason: SURG

## 2023-09-26 RX ORDER — KETOROLAC TROMETHAMINE 30 MG/ML
INJECTION, SOLUTION INTRAMUSCULAR; INTRAVENOUS AS NEEDED
Status: DISCONTINUED | OUTPATIENT
Start: 2023-09-26 | End: 2023-09-26 | Stop reason: SURG

## 2023-09-26 RX ORDER — KETOROLAC TROMETHAMINE 15 MG/ML
15 INJECTION, SOLUTION INTRAMUSCULAR; INTRAVENOUS EVERY 6 HOURS
Status: COMPLETED | OUTPATIENT
Start: 2023-09-26 | End: 2023-09-26

## 2023-09-26 RX ORDER — PROPOFOL 10 MG/ML
VIAL (ML) INTRAVENOUS AS NEEDED
Status: DISCONTINUED | OUTPATIENT
Start: 2023-09-26 | End: 2023-09-26 | Stop reason: SURG

## 2023-09-26 RX ORDER — MAGNESIUM SULFATE HEPTAHYDRATE 500 MG/ML
INJECTION, SOLUTION INTRAMUSCULAR; INTRAVENOUS AS NEEDED
Status: DISCONTINUED | OUTPATIENT
Start: 2023-09-26 | End: 2023-09-26 | Stop reason: SURG

## 2023-09-26 RX ORDER — ONDANSETRON 4 MG/1
4 TABLET, FILM COATED ORAL EVERY 6 HOURS PRN
Status: DISCONTINUED | OUTPATIENT
Start: 2023-09-26 | End: 2023-09-27 | Stop reason: HOSPADM

## 2023-09-26 RX ORDER — ONDANSETRON 2 MG/ML
4 INJECTION INTRAMUSCULAR; INTRAVENOUS EVERY 6 HOURS PRN
Status: DISCONTINUED | OUTPATIENT
Start: 2023-09-26 | End: 2023-09-27 | Stop reason: HOSPADM

## 2023-09-26 RX ORDER — PROMETHAZINE HYDROCHLORIDE 12.5 MG/1
25 TABLET ORAL ONCE AS NEEDED
Status: DISCONTINUED | OUTPATIENT
Start: 2023-09-26 | End: 2023-09-26 | Stop reason: HOSPADM

## 2023-09-26 RX ORDER — MAGNESIUM HYDROXIDE 1200 MG/15ML
LIQUID ORAL AS NEEDED
Status: DISCONTINUED | OUTPATIENT
Start: 2023-09-26 | End: 2023-09-26 | Stop reason: HOSPADM

## 2023-09-26 RX ORDER — DEXAMETHASONE SODIUM PHOSPHATE 4 MG/ML
INJECTION, SOLUTION INTRA-ARTICULAR; INTRALESIONAL; INTRAMUSCULAR; INTRAVENOUS; SOFT TISSUE AS NEEDED
Status: DISCONTINUED | OUTPATIENT
Start: 2023-09-26 | End: 2023-09-26 | Stop reason: SURG

## 2023-09-26 RX ORDER — SODIUM CHLORIDE, SODIUM LACTATE, POTASSIUM CHLORIDE, CALCIUM CHLORIDE 600; 310; 30; 20 MG/100ML; MG/100ML; MG/100ML; MG/100ML
9 INJECTION, SOLUTION INTRAVENOUS CONTINUOUS PRN
Status: DISCONTINUED | OUTPATIENT
Start: 2023-09-26 | End: 2023-09-26 | Stop reason: HOSPADM

## 2023-09-26 RX ORDER — MEPERIDINE HYDROCHLORIDE 25 MG/ML
12.5 INJECTION INTRAMUSCULAR; INTRAVENOUS; SUBCUTANEOUS ONCE
Status: COMPLETED | OUTPATIENT
Start: 2023-09-26 | End: 2023-09-26

## 2023-09-26 RX ORDER — OXYCODONE HYDROCHLORIDE 5 MG/1
5 TABLET ORAL
Status: DISCONTINUED | OUTPATIENT
Start: 2023-09-26 | End: 2023-09-26 | Stop reason: HOSPADM

## 2023-09-26 RX ORDER — ONDANSETRON 2 MG/ML
4 INJECTION INTRAMUSCULAR; INTRAVENOUS ONCE AS NEEDED
Status: DISCONTINUED | OUTPATIENT
Start: 2023-09-26 | End: 2023-09-26 | Stop reason: HOSPADM

## 2023-09-26 RX ORDER — DEXTROSE MONOHYDRATE, SODIUM CHLORIDE, AND POTASSIUM CHLORIDE 50; 1.49; 4.5 G/1000ML; G/1000ML; G/1000ML
100 INJECTION, SOLUTION INTRAVENOUS CONTINUOUS
Status: DISCONTINUED | OUTPATIENT
Start: 2023-09-26 | End: 2023-09-27 | Stop reason: HOSPADM

## 2023-09-26 RX ORDER — KETOROLAC TROMETHAMINE 30 MG/ML
15 INJECTION, SOLUTION INTRAMUSCULAR; INTRAVENOUS EVERY 6 HOURS PRN
Status: DISCONTINUED | OUTPATIENT
Start: 2023-09-26 | End: 2023-09-27 | Stop reason: HOSPADM

## 2023-09-26 RX ORDER — PHENAZOPYRIDINE HYDROCHLORIDE 200 MG/1
200 TABLET, FILM COATED ORAL ONCE
Status: COMPLETED | OUTPATIENT
Start: 2023-09-26 | End: 2023-09-26

## 2023-09-26 RX ORDER — KETAMINE HCL IN NACL, ISO-OSM 100MG/10ML
SYRINGE (ML) INJECTION AS NEEDED
Status: DISCONTINUED | OUTPATIENT
Start: 2023-09-26 | End: 2023-09-26 | Stop reason: SURG

## 2023-09-26 RX ORDER — FAMOTIDINE 20 MG/1
20 TABLET, FILM COATED ORAL
Status: DISCONTINUED | OUTPATIENT
Start: 2023-09-26 | End: 2023-09-27 | Stop reason: HOSPADM

## 2023-09-26 RX ORDER — DOCUSATE SODIUM 100 MG/1
100 CAPSULE, LIQUID FILLED ORAL 2 TIMES DAILY PRN
Status: DISCONTINUED | OUTPATIENT
Start: 2023-09-26 | End: 2023-09-27 | Stop reason: HOSPADM

## 2023-09-26 RX ORDER — ONDANSETRON 2 MG/ML
INJECTION INTRAMUSCULAR; INTRAVENOUS AS NEEDED
Status: DISCONTINUED | OUTPATIENT
Start: 2023-09-26 | End: 2023-09-26 | Stop reason: SURG

## 2023-09-26 RX ORDER — OXYCODONE HYDROCHLORIDE 5 MG/1
5 TABLET ORAL EVERY 4 HOURS PRN
Status: DISCONTINUED | OUTPATIENT
Start: 2023-09-26 | End: 2023-09-27 | Stop reason: HOSPADM

## 2023-09-26 RX ORDER — PROMETHAZINE HYDROCHLORIDE 25 MG/1
25 SUPPOSITORY RECTAL ONCE AS NEEDED
Status: DISCONTINUED | OUTPATIENT
Start: 2023-09-26 | End: 2023-09-26 | Stop reason: HOSPADM

## 2023-09-26 RX ORDER — BUPIVACAINE HYDROCHLORIDE 2.5 MG/ML
INJECTION, SOLUTION EPIDURAL; INFILTRATION; INTRACAUDAL AS NEEDED
Status: DISCONTINUED | OUTPATIENT
Start: 2023-09-26 | End: 2023-09-26 | Stop reason: HOSPADM

## 2023-09-26 RX ADMIN — FENTANYL CITRATE 50 MCG: 50 INJECTION, SOLUTION INTRAMUSCULAR; INTRAVENOUS at 07:35

## 2023-09-26 RX ADMIN — ALUMINUM HYDROXIDE, MAGNESIUM HYDROXIDE, AND DIMETHICONE 30 ML: 400; 400; 40 SUSPENSION ORAL at 20:45

## 2023-09-26 RX ADMIN — ROCURONIUM BROMIDE 10 MG: 50 INJECTION INTRAVENOUS at 08:46

## 2023-09-26 RX ADMIN — Medication 25 MG: at 07:40

## 2023-09-26 RX ADMIN — HYDROMORPHONE HYDROCHLORIDE 0.5 MG: 1 INJECTION, SOLUTION INTRAMUSCULAR; INTRAVENOUS; SUBCUTANEOUS at 10:25

## 2023-09-26 RX ADMIN — ROCURONIUM BROMIDE 5 MG: 50 INJECTION INTRAVENOUS at 07:35

## 2023-09-26 RX ADMIN — OXYCODONE HYDROCHLORIDE 5 MG: 5 TABLET ORAL at 15:11

## 2023-09-26 RX ADMIN — HYDROMORPHONE HYDROCHLORIDE 0.5 MG: 1 INJECTION, SOLUTION INTRAMUSCULAR; INTRAVENOUS; SUBCUTANEOUS at 10:47

## 2023-09-26 RX ADMIN — ONDANSETRON 4 MG: 2 INJECTION INTRAMUSCULAR; INTRAVENOUS at 09:28

## 2023-09-26 RX ADMIN — CEFAZOLIN SODIUM 2000 MG: 2 INJECTION, SOLUTION INTRAVENOUS at 07:46

## 2023-09-26 RX ADMIN — FENTANYL CITRATE 50 MCG: 50 INJECTION, SOLUTION INTRAMUSCULAR; INTRAVENOUS at 08:07

## 2023-09-26 RX ADMIN — MAGNESIUM SULFATE HEPTAHYDRATE 1 G: 500 INJECTION, SOLUTION INTRAMUSCULAR; INTRAVENOUS at 08:07

## 2023-09-26 RX ADMIN — ACETAMINOPHEN 650 MG: 325 TABLET ORAL at 12:13

## 2023-09-26 RX ADMIN — KETOROLAC TROMETHAMINE 15 MG: 30 INJECTION, SOLUTION INTRAMUSCULAR; INTRAVENOUS at 20:59

## 2023-09-26 RX ADMIN — ACETAMINOPHEN 650 MG: 325 TABLET ORAL at 18:44

## 2023-09-26 RX ADMIN — POTASSIUM CHLORIDE, DEXTROSE MONOHYDRATE AND SODIUM CHLORIDE 100 ML/HR: 150; 5; 450 INJECTION, SOLUTION INTRAVENOUS at 11:59

## 2023-09-26 RX ADMIN — PHENAZOPYRIDINE HYDROCHLORIDE 200 MG: 200 TABLET ORAL at 07:18

## 2023-09-26 RX ADMIN — Medication 25 MG: at 08:46

## 2023-09-26 RX ADMIN — SODIUM CHLORIDE, POTASSIUM CHLORIDE, SODIUM LACTATE AND CALCIUM CHLORIDE 9 ML/HR: 600; 310; 30; 20 INJECTION, SOLUTION INTRAVENOUS at 06:51

## 2023-09-26 RX ADMIN — MIDAZOLAM HYDROCHLORIDE 2 MG: 1 INJECTION, SOLUTION INTRAMUSCULAR; INTRAVENOUS at 07:18

## 2023-09-26 RX ADMIN — KETOROLAC TROMETHAMINE 30 MG: 30 INJECTION, SOLUTION INTRAMUSCULAR; INTRAVENOUS at 09:29

## 2023-09-26 RX ADMIN — DEXAMETHASONE SODIUM PHOSPHATE 8 MG: 4 INJECTION, SOLUTION INTRAMUSCULAR; INTRAVENOUS at 07:42

## 2023-09-26 RX ADMIN — SCOPOLAMINE 1 PATCH: 1.5 PATCH, EXTENDED RELEASE TRANSDERMAL at 08:16

## 2023-09-26 RX ADMIN — PROPOFOL 180 MG: 10 INJECTION, EMULSION INTRAVENOUS at 07:35

## 2023-09-26 RX ADMIN — ROCURONIUM BROMIDE 35 MG: 50 INJECTION INTRAVENOUS at 07:56

## 2023-09-26 RX ADMIN — SUGAMMADEX 200 MG: 100 INJECTION, SOLUTION INTRAVENOUS at 09:38

## 2023-09-26 RX ADMIN — LIDOCAINE HYDROCHLORIDE 100 MG: 20 INJECTION, SOLUTION EPIDURAL; INFILTRATION; INTRACAUDAL; PERINEURAL at 07:35

## 2023-09-26 RX ADMIN — MEPERIDINE HYDROCHLORIDE 12.5 MG: 25 INJECTION INTRAMUSCULAR; INTRAVENOUS; SUBCUTANEOUS at 10:14

## 2023-09-26 RX ADMIN — FAMOTIDINE 20 MG: 20 TABLET ORAL at 16:54

## 2023-09-26 RX ADMIN — KETOROLAC TROMETHAMINE 15 MG: 15 INJECTION, SOLUTION INTRAMUSCULAR; INTRAVENOUS at 16:54

## 2023-09-26 RX ADMIN — HYDROMORPHONE HYDROCHLORIDE 0.5 MG: 1 INJECTION, SOLUTION INTRAMUSCULAR; INTRAVENOUS; SUBCUTANEOUS at 09:21

## 2023-09-26 RX ADMIN — ACETAMINOPHEN 1000 MG: 500 TABLET ORAL at 06:51

## 2023-09-26 RX ADMIN — Medication 100 MG: at 07:35

## 2023-09-26 RX ADMIN — SODIUM CHLORIDE, POTASSIUM CHLORIDE, SODIUM LACTATE AND CALCIUM CHLORIDE: 600; 310; 30; 20 INJECTION, SOLUTION INTRAVENOUS at 09:30

## 2023-09-26 RX ADMIN — KETOROLAC TROMETHAMINE 15 MG: 15 INJECTION, SOLUTION INTRAMUSCULAR; INTRAVENOUS at 12:08

## 2023-09-26 NOTE — ANESTHESIA PREPROCEDURE EVALUATION
Anesthesia Evaluation     Patient summary reviewed and Nursing notes reviewed                Airway   Mallampati: I  TM distance: >3 FB  Neck ROM: full  No difficulty expected  Dental      Pulmonary - negative pulmonary ROS and normal exam    breath sounds clear to auscultation  Cardiovascular - negative cardio ROS and normal exam    Rhythm: regular  Rate: normal        Neuro/Psych  (+) psychiatric history  GI/Hepatic/Renal/Endo - negative ROS     Musculoskeletal (-) negative ROS    Abdominal    Substance History - negative use     OB/GYN negative ob/gyn ROS         Other                      Anesthesia Plan    ASA 1     general     intravenous induction     Anesthetic plan, risks, benefits, and alternatives have been provided, discussed and informed consent has been obtained with: patient.    CODE STATUS:

## 2023-09-26 NOTE — PROGRESS NOTES
Called by RN for bradycardia down to 33;  pt with typical HR 40's - 70s;  pt asymptomatic at this time but has had several trips to the ER;  will get EKG and hospitalist consult to ensure discharge is appropriate

## 2023-09-26 NOTE — OP NOTE
TOTAL LAPAROSCOPIC HYSTERECTOMY, CYSTOSCOPY  Procedure Report    Patient Name:  Chelsy Patterson  YOB: 1995    Date of Surgery:  9/26/2023     Indications:  Chronic pelvic pain; leiomyomata uteri     Pre-op Diagnosis:   Leiomyoma, subserous [D25.2]       Post-Op Diagnosis Codes:     * Leiomyoma, subserous [D25.2]    Procedure/CPT® Codes:      Procedure(s):  TOTAL LAPAROSCOPIC HYSTERECTOMY  CYSTOSCOPY    Staff:  Surgeon(s):  Dejah Hussein MD;  First assist: Porsche Tate DO         Anesthesia: General    Estimated Blood Loss: 30 mL    Implants:    Implant Name Type Inv. Item Serial No.  Lot No. LRB No. Used Action   DEV CLS WND VLOC/180 BOLIVAR ABS 1/2CIR SZ2/0 15CM 27MM GRN - [id number] Implant DEV CLS WND VLOC/180 BOLIVAR ABS 1/2CIR SZ2/0 15CM 27MM GRN  COVIDIEN [id number] N/A 1 Implanted   HEMOST ABS SURGICEL 2X14 - [id number] Implant HEMOST ABS SURGICEL 2X14  [employer]  DIV [employer] J [employer] J [id number] N/A 1 Implanted       Specimen:          Specimens       ID Source Type Tests Collected By Collected At Frozen?    A Uterus with Cervix Tissue TISSUE PATHOLOGY EXAM   Dejah Hussein MD 9/26/23 0858 No    Comment: Removed vaginally  Weight: 114 grams                 Findings: Enlarged uterus; grossly normal appearing ovaries.  Absence of fallopian tubes.  Smooth peritoneal surface.  E flux of dye from both ureteral orifice.    Complications: None    Description of Procedure: The appropriate consents were obtained.  The patient taken to the operating suite and intubated and general anesthesia administered.  She was positioned in the dorsal lithotomy position utilizing the universal Juan R stirrups.  She was prepped and draped in the usual sterile fashion.  Time out procedures were carried out.  She had pelvic exam under anesthesia.  The speculum was placed.  The measurements are obtained to determine the size of the Elke uterine manipulator that would be required.  This was placed  without difficulty.  Indwelling Dennis catheter was anchored.  The patient was repositioned, and gloves changed and attention turned to the abdominal portion of the procedure.  Before making any abdominal incisions quarter percent Marcaine plain was infiltrated.  The first incision was a stab incision at the superior aspect of the umbilicus.  The cut edges were grasped with penetrating towel clamps to elevate the anterior abdominal wall.  The Veress needle was introduced, and placement assessed by the hanging drop technique.  Once CO2 insufflation was deemed adequate the Veress was removed and the 5 mm trocar introduced under direct visualization.  The patient was then placed in Trendelenburg position.  Inspection was carried out. An 8 mm trocar was placed via stab incision approximately 8 cm lateral and 4 cm inferior to the umbilical port.  A 5 mm trocar was similarly placed contralateral to that.  The ureters were identified and noted to be peristaltic.  Additional pelvic findings as noted above.  The LigaSure device was then utilized to isolate cauterize and form pedicles on the patient's left starting with the fallopian tube and the mesosalpinx the utero-ovarian ligament the cardinal ligament the round ligament.  Once the upper limits of the bladder were identified;  The bladder flap was then developed.  The uterine vessels were then skeletonized and cauterized in the patient's left.  Attention was then turned to the opposing side whereby the steps were duplicated.  Once the uterine vessels were cauterized the pedicles were formed and the vessels lateralized.  The uterine vessels on the patient's left were then cauterized and the pedicles formed.  The vessels were also lateralized.  The landmarks were identified to form the anterior and posterior colpotomy utilizing the LigaSure and the hook device.   The uterus was removed through the vagina.  A blue bulb was placed to maintain pneumoperitoneum.  A 5 mm port was  placed approximately 2 cm medial and superior to the anterior superior iliac spine.  This was placed under direct visualization.  The V-loc suture was then utilized to reapproximate the vaginal cuff utilizing a continuous running technique.  The pelvis was copiously irrigated.  Once hemostasis was deemed adequate the CO2 was released down to 6 mmHg.  Hemostasis was deemed adequate.  The CO2 was then released the trochars removed and attention turned to closure of the abdominal incisions.  The incisions were reapproximated utilizing running and interrupted technique for good hemostasis and reapproximation with 4-0 Monocryl.  Cystoscopy was performed.  Pyridium stained urine was noted to reflux from both ureteral orifice.  The bladder bulb was identified.  There was no debris within the bladder cavity.  The patient was left in stable condition awaiting extubation and application of dressings.  She would then be transported to the PACU.  Specimens were prepared for transport to pathology.  This is the end of dictation.  Dr. Porsche Tate was responsible for performing and assisting with the following activities: Retraction, Suction, Irrigation, Suturing, Closing, Held/Positioned Camera, and cystoscopy  and their skilled assistance was necessary for the success of this case.        Dejah Hussein MD     Date: 9/26/2023  Time: 10:17 EDT

## 2023-09-26 NOTE — ANESTHESIA POSTPROCEDURE EVALUATION
Patient: Chelsy Patterson    Procedure Summary       Date: 09/26/23 Room / Location: [facility] OR 05 / [facility] MAIN OR    Anesthesia Start: 0730 Anesthesia Stop: 0959    Procedures:       TOTAL LAPAROSCOPIC HYSTERECTOMY (Abdomen)      CYSTOSCOPY (Bladder) Diagnosis:       Leiomyoma, subserous      (Leiomyoma, subserous [D25.2])    Surgeons: Dejah Hussein MD Provider: Joey Ulloa MD    Anesthesia Type: general ASA Status: 1            Anesthesia Type: general    Vitals  Vitals Value Taken Time   /75 09/26/23 1017   Temp 36.2 °C (97.1 °F) 09/26/23 0955   Pulse 74 09/26/23 1020   Resp 18 09/26/23 0955   SpO2 99 % 09/26/23 1020   Vitals shown include unvalidated device data.        Post Anesthesia Care and Evaluation    Patient location during evaluation: bedside  Patient participation: complete - patient participated  Level of consciousness: awake  Pain management: adequate    Airway patency: patent  PONV Status: none  Cardiovascular status: acceptable  Respiratory status: acceptable  Hydration status: acceptable    Comments: An Anesthesiologist personally participated in the most demanding procedures (including induction and emergence if applicable) in the anesthesia plan, monitored the course of anesthesia administration at frequent intervals and remained physically present and available for immediate diagnosis and treatment of emergencies.

## 2023-09-27 ENCOUNTER — APPOINTMENT (OUTPATIENT)
Dept: CARDIOLOGY | Facility: HOSPITAL | Age: 28
End: 2023-09-27
Payer: COMMERCIAL

## 2023-09-27 VITALS
OXYGEN SATURATION: 98 % | SYSTOLIC BLOOD PRESSURE: 101 MMHG | WEIGHT: 115.96 LBS | BODY MASS INDEX: 22.77 KG/M2 | HEART RATE: 89 BPM | TEMPERATURE: 99 F | RESPIRATION RATE: 18 BRPM | DIASTOLIC BLOOD PRESSURE: 53 MMHG | HEIGHT: 60 IN

## 2023-09-27 PROBLEM — R00.1 BRADYCARDIA, SINUS: Status: ACTIVE | Noted: 2023-09-27

## 2023-09-27 LAB
ALBUMIN SERPL-MCNC: 3.4 G/DL (ref 3.5–5.2)
ALBUMIN/GLOB SERPL: 1.5 G/DL
ALP SERPL-CCNC: 57 U/L (ref 39–117)
ALT SERPL W P-5'-P-CCNC: 13 U/L (ref 1–33)
ANION GAP SERPL CALCULATED.3IONS-SCNC: 8.3 MMOL/L (ref 5–15)
AST SERPL-CCNC: 18 U/L (ref 1–32)
BASOPHILS # BLD AUTO: 0.05 10*3/MM3 (ref 0–0.2)
BASOPHILS NFR BLD AUTO: 0.4 % (ref 0–1.5)
BH CV ECHO MEAS - AO MAX PG: 6 MMHG
BH CV ECHO MEAS - AO MEAN PG: 2.9 MMHG
BH CV ECHO MEAS - AO ROOT DIAM: 2.44 CM
BH CV ECHO MEAS - AO V2 MAX: 125 CM/SEC
BH CV ECHO MEAS - AO V2 VTI: 25.5 CM
BH CV ECHO MEAS - AVA(I,D): 2.6 CM2
BH CV ECHO MEAS - EDV(CUBED): 67.5 ML
BH CV ECHO MEAS - EDV(MOD-SP2): 94.5 ML
BH CV ECHO MEAS - EDV(MOD-SP4): 67.2 ML
BH CV ECHO MEAS - EF(MOD-BP): 65.6 %
BH CV ECHO MEAS - EF(MOD-SP2): 68.8 %
BH CV ECHO MEAS - EF(MOD-SP4): 60.4 %
BH CV ECHO MEAS - ESV(CUBED): 16.6 ML
BH CV ECHO MEAS - ESV(MOD-SP2): 29.5 ML
BH CV ECHO MEAS - ESV(MOD-SP4): 26.6 ML
BH CV ECHO MEAS - FS: 37.4 %
BH CV ECHO MEAS - IVS/LVPW: 0.98 CM
BH CV ECHO MEAS - IVSD: 0.85 CM
BH CV ECHO MEAS - LA DIMENSION: 3.2 CM
BH CV ECHO MEAS - LAT PEAK E' VEL: 18.2 CM/SEC
BH CV ECHO MEAS - LV MASS(C)D: 106 GRAMS
BH CV ECHO MEAS - LV MAX PG: 4.2 MMHG
BH CV ECHO MEAS - LV MEAN PG: 1.94 MMHG
BH CV ECHO MEAS - LV V1 MAX: 102.8 CM/SEC
BH CV ECHO MEAS - LV V1 VTI: 21.3 CM
BH CV ECHO MEAS - LVIDD: 4.1 CM
BH CV ECHO MEAS - LVIDS: 2.5 CM
BH CV ECHO MEAS - LVOT AREA: 3.1 CM2
BH CV ECHO MEAS - LVOT DIAM: 1.99 CM
BH CV ECHO MEAS - LVPWD: 0.87 CM
BH CV ECHO MEAS - MED PEAK E' VEL: 11.7 CM/SEC
BH CV ECHO MEAS - MV A MAX VEL: 58.2 CM/SEC
BH CV ECHO MEAS - MV DEC SLOPE: 474.2 CM/SEC2
BH CV ECHO MEAS - MV DEC TIME: 0.24 SEC
BH CV ECHO MEAS - MV E MAX VEL: 101 CM/SEC
BH CV ECHO MEAS - MV E/A: 1.73
BH CV ECHO MEAS - MV MAX PG: 6 MMHG
BH CV ECHO MEAS - MV MEAN PG: 1.28 MMHG
BH CV ECHO MEAS - MV P1/2T: 80.5 MSEC
BH CV ECHO MEAS - MV V2 VTI: 46.7 CM
BH CV ECHO MEAS - MVA(P1/2T): 2.7 CM2
BH CV ECHO MEAS - MVA(VTI): 1.41 CM2
BH CV ECHO MEAS - RAP SYSTOLE: 3 MMHG
BH CV ECHO MEAS - RVDD: 2.2 CM
BH CV ECHO MEAS - RVSP: 16.9 MMHG
BH CV ECHO MEAS - SV(LVOT): 66 ML
BH CV ECHO MEAS - SV(MOD-SP2): 65 ML
BH CV ECHO MEAS - SV(MOD-SP4): 40.6 ML
BH CV ECHO MEAS - TR MAX PG: 13.9 MMHG
BH CV ECHO MEAS - TR MAX VEL: 186.2 CM/SEC
BH CV ECHO MEASUREMENTS AVERAGE E/E' RATIO: 6.76
BILIRUB SERPL-MCNC: 0.5 MG/DL (ref 0–1.2)
BUN SERPL-MCNC: 6 MG/DL (ref 6–20)
BUN/CREAT SERPL: 8.1 (ref 7–25)
CALCIUM SPEC-SCNC: 8.5 MG/DL (ref 8.6–10.5)
CHLORIDE SERPL-SCNC: 106 MMOL/L (ref 98–107)
CO2 SERPL-SCNC: 22.7 MMOL/L (ref 22–29)
CREAT SERPL-MCNC: 0.74 MG/DL (ref 0.57–1)
DEPRECATED RDW RBC AUTO: 43 FL (ref 37–54)
EGFRCR SERPLBLD CKD-EPI 2021: 111.8 ML/MIN/1.73
EOSINOPHIL # BLD AUTO: 0.01 10*3/MM3 (ref 0–0.4)
EOSINOPHIL NFR BLD AUTO: 0.1 % (ref 0.3–6.2)
ERYTHROCYTE [DISTWIDTH] IN BLOOD BY AUTOMATED COUNT: 13.7 % (ref 12.3–15.4)
GLOBULIN UR ELPH-MCNC: 2.2 GM/DL
GLUCOSE SERPL-MCNC: 99 MG/DL (ref 65–99)
HCT VFR BLD AUTO: 34.2 % (ref 34–46.6)
HGB BLD-MCNC: 11.1 G/DL (ref 12–15.9)
IMM GRANULOCYTES # BLD AUTO: 0.05 10*3/MM3 (ref 0–0.05)
IMM GRANULOCYTES NFR BLD AUTO: 0.4 % (ref 0–0.5)
IVRT: 85 MS
LEFT ATRIUM VOLUME INDEX: 21.7 ML/M2
LYMPHOCYTES # BLD AUTO: 2.42 10*3/MM3 (ref 0.7–3.1)
LYMPHOCYTES NFR BLD AUTO: 19.4 % (ref 19.6–45.3)
MAGNESIUM SERPL-MCNC: 2.1 MG/DL (ref 1.6–2.6)
MCH RBC QN AUTO: 28.1 PG (ref 26.6–33)
MCHC RBC AUTO-ENTMCNC: 32.5 G/DL (ref 31.5–35.7)
MCV RBC AUTO: 86.6 FL (ref 79–97)
MONOCYTES # BLD AUTO: 1.23 10*3/MM3 (ref 0.1–0.9)
MONOCYTES NFR BLD AUTO: 9.8 % (ref 5–12)
NEUTROPHILS NFR BLD AUTO: 69.9 % (ref 42.7–76)
NEUTROPHILS NFR BLD AUTO: 8.73 10*3/MM3 (ref 1.7–7)
NRBC BLD AUTO-RTO: 0 /100 WBC (ref 0–0.2)
PLATELET # BLD AUTO: 176 10*3/MM3 (ref 140–450)
PMV BLD AUTO: 11.1 FL (ref 6–12)
POTASSIUM SERPL-SCNC: 4.3 MMOL/L (ref 3.5–5.2)
PROT SERPL-MCNC: 5.6 G/DL (ref 6–8.5)
RBC # BLD AUTO: 3.95 10*6/MM3 (ref 3.77–5.28)
SODIUM SERPL-SCNC: 137 MMOL/L (ref 136–145)
WBC NRBC COR # BLD: 12.49 10*3/MM3 (ref 3.4–10.8)

## 2023-09-27 PROCEDURE — 93306 TTE W/DOPPLER COMPLETE: CPT

## 2023-09-27 PROCEDURE — 85025 COMPLETE CBC W/AUTO DIFF WBC: CPT | Performed by: PHYSICIAN ASSISTANT

## 2023-09-27 PROCEDURE — 80053 COMPREHEN METABOLIC PANEL: CPT | Performed by: PHYSICIAN ASSISTANT

## 2023-09-27 PROCEDURE — 83735 ASSAY OF MAGNESIUM: CPT | Performed by: PHYSICIAN ASSISTANT

## 2023-09-27 PROCEDURE — 99203 OFFICE O/P NEW LOW 30 MIN: CPT | Performed by: INTERNAL MEDICINE

## 2023-09-27 PROCEDURE — 93306 TTE W/DOPPLER COMPLETE: CPT | Performed by: INTERNAL MEDICINE

## 2023-09-27 PROCEDURE — 25010000002 KETOROLAC TROMETHAMINE PER 15 MG: Performed by: PHYSICIAN ASSISTANT

## 2023-09-27 RX ORDER — ACETAMINOPHEN 325 MG/1
650 TABLET ORAL EVERY 6 HOURS
Status: DISCONTINUED | OUTPATIENT
Start: 2023-09-27 | End: 2023-09-27 | Stop reason: HOSPADM

## 2023-09-27 RX ADMIN — KETOROLAC TROMETHAMINE 15 MG: 30 INJECTION, SOLUTION INTRAMUSCULAR; INTRAVENOUS at 05:23

## 2023-09-27 RX ADMIN — ACETAMINOPHEN 650 MG: 325 TABLET ORAL at 09:38

## 2023-09-27 RX ADMIN — ACETAMINOPHEN 650 MG: 325 TABLET ORAL at 13:43

## 2023-09-27 RX ADMIN — FAMOTIDINE 20 MG: 20 TABLET ORAL at 09:38

## 2023-09-27 RX ADMIN — ACETAMINOPHEN 650 MG: 325 TABLET ORAL at 00:21

## 2023-09-27 NOTE — CONSULTS
[facility]   Hospitalist Consult Note  Date: 2023   Patient Name: Chelsy Patterson  : 1995  MRN: [id number]  Primary Care Physician:  Provider, No Known  Referring Physician: Dejah Hussein MD  Date of admission: 2023    Subjective   Subjective     Reason for Consult/ Chief Complaint: Bradycardia    HPI:  Chelsy Patterson is a 28 y.o. female who was admitted under the OB/GYN service postop for a hysterectomy.  During a routine vitals check patient's heart rate was in the 40s.  An EKG was obtained and we were consulted to weigh in on the patient's bradycardia.    Upon interview of the patient, she states that she has been having presyncopal and true syncopal episodes for roughly the past year.  She states that at times she will feel that she is going to pass out, but then with any type of activity she feels her heart race and will have vomiting.    Review of Systems   All systems were reviewed and negative except for: Presyncope, syncope, vomiting    Personal History     Past Medical History:  Past Medical History:   Diagnosis Date    ADHD     Anemia     Anxiety     Chlamydia     Chronic post-traumatic stress disorder (PTSD)     no issues coming out of anesthesia    Diverticulitis     Fibroid     Gonorrhea     PCOS (polycystic ovarian syndrome)     PONV (postoperative nausea and vomiting)     Scoliosis     neck       Past Surgical History:  Past Surgical History:   Procedure Laterality Date    SALPINGECTOMY         Family History:   Family History   Adopted: Yes   Problem Relation Age of Onset    Breast cancer Maternal Grandmother     Colon cancer Neg Hx     Melanoma Neg Hx     Pulmonary embolism Neg Hx     Deep vein thrombosis Neg Hx     Uterine cancer Neg Hx     Ovarian cancer Neg Hx        Social History:   Social History     Socioeconomic History    Marital status:    Tobacco Use    Smoking status: Every Day     Packs/day: 0.50     Types: Cigarettes    Smokeless  tobacco: Never   Vaping Use    Vaping Use: Some days    Substances: Nicotine    Devices: Disposable   Substance and Sexual Activity    Alcohol use: Yes     Comment: rarely    Drug use: Yes     Frequency: 5.0 times per week     Comment: THC    Sexual activity: Yes     Partners: Male     Birth control/protection: Tubal ligation       Home Medications:  amphetamine-dextroamphetamine, amphetamine-dextroamphetamine XR, docusate sodium, ibuprofen, ondansetron ODT, and oxyCODONE    Allergies:  Allergies   Allergen Reactions    Bentyl [Dicyclomine] Nausea And Vomiting     Objective    Objective     Vitals:   Temp:  [97 °F (36.1 °C)-98.4 °F (36.9 °C)] 98.1 °F (36.7 °C)  Heart Rate:  [44-98] 73  Resp:  [16-20] 16  BP: ()/(48-81) 101/63  Flow (L/min):  [2] 2    Physical Exam:   Constitutional: Awake, alert, no acute distress   Eyes: Pupils equal, sclerae anicteric, no conjunctival injection   HENT: NCAT, mucous membranes moist   Neck: Supple, no thyromegaly, no lymphadenopathy, trachea midline   Respiratory: Clear to auscultation bilaterally, nonlabored respirations    Cardiovascular: Irregular rhythm, no murmurs, rubs, or gallops, palpable pedal pulses bilaterally   Gastrointestinal: Positive bowel sounds, soft, nontender, nondistended   Musculoskeletal: No bilateral ankle edema, no clubbing or cyanosis to extremities   Psychiatric: Appropriate affect, cooperative   Neurologic: Oriented x 3, strength symmetric in all extremities, Cranial Nerves grossly intact to confrontation, speech clear   Skin: No rashes     Result Review    Result Review:  I have personally reviewed the results from the time of this admission to 9/26/2023 20:33 EDT and agree with these findings:  []  Laboratory  []  Microbiology  []  Radiology  []  EKG/Telemetry   []  Cardiology/Vascular   []  Pathology  []  Old records  []  Other:    Assessment & Plan   Assessment / Plan     Assessment:  Sinus arrhythmia with bradycardia, symptomatic  Chronic  pelvic pain and leiomyoma status post laparoscopic hysterectomy  ADHD    Plan:  We will keep patient overnight and monitor on telemetry.    Will obtain CBC, CMP, mag, thyroid panel  Would also consult cardiology to see the patient and do possible Holter monitor on discharge.    Thank you for allowing us to participate in the care of this patient, we will follow along with you.    DVT prophylaxis:  Mechanical DVT prophylaxis orders are present.    CODE STATUS:    Code Status (Patient has no pulse and is not breathing): CPR (Attempt to Resuscitate)  Medical Interventions (Patient has pulse or is breathing): Full Support      Electronically signed by ILANA Heller, 09/26/23, 8:33 PM EDT.

## 2023-09-27 NOTE — PLAN OF CARE
Goal Outcome Evaluation:    AAO X4. TOLERATING DIET, ROOM AIR, & ACTIVITY AD CECI. VOICES UNDERSTANDING OF D/C INSTRUCTIONS & F/U CARE. SPOUSE AT BEDSIDE.

## 2023-09-27 NOTE — CONSULTS
Cardiology Consult Note  [address] SERVICES          Patient Identification:  Chelsy Patterson      [id number]  28 y.o.        female  1995           Reason for Consultation: Bradycardia    PCP: Provider, No Known    History of Present Illness:     Patient is a 28-year-old female who status post a hysterectomy yesterday was noted to be bradycardic symptoms to be down in the 40s she does report episodes of syncope over the last several years a lot of times occurring when she is standing outside in the heat or after showers.  She reports issues of feeling hot nauseated diaphoretic and then presyncopal primarily with symptoms being aborted by sitting down.  She denies any problems with chest pain or change in breathing capacity.  She is not having significant tachycardic problems.    Past History:  Past Medical History:   Diagnosis Date    ADHD     Anemia     Anxiety     Chlamydia     Chronic post-traumatic stress disorder (PTSD)     no issues coming out of anesthesia    Diverticulitis     Fibroid     Gonorrhea     PCOS (polycystic ovarian syndrome)     PONV (postoperative nausea and vomiting)     Scoliosis     neck     Past Surgical History:   Procedure Laterality Date    CYSTOSCOPY N/A 9/26/2023    Procedure: CYSTOSCOPY;  Surgeon: Dejah Hussein MD;  Location: [facility];  Service: Gynecology;  Laterality: N/A;    SALPINGECTOMY      TOTAL LAPAROSCOPIC HYSTERECTOMY N/A 9/26/2023    Procedure: TOTAL LAPAROSCOPIC HYSTERECTOMY;  Surgeon: Dejah Hussein MD;  Location: [facility];  Service: Gynecology;  Laterality: N/A;     Allergies   Allergen Reactions    Bentyl [Dicyclomine] Nausea And Vomiting     Social History     Socioeconomic History    Marital status:    Tobacco Use    Smoking status: Every Day     Packs/day: 0.50     Types: Cigarettes    Smokeless tobacco: Never   Vaping Use    Vaping Use: Some days    Substances: Nicotine    Devices:  Disposable   Substance and Sexual Activity    Alcohol use: Yes     Comment: rarely    Drug use: Yes     Frequency: 5.0 times per week     Comment: THC    Sexual activity: Yes     Partners: Male     Birth control/protection: Tubal ligation     Family History   Adopted: Yes   Problem Relation Age of Onset    Breast cancer Maternal Grandmother     Colon cancer Neg Hx     Melanoma Neg Hx     Pulmonary embolism Neg Hx     Deep vein thrombosis Neg Hx     Uterine cancer Neg Hx     Ovarian cancer Neg Hx        Medications:  Prior to Admission medications    Medication Sig Start Date End Date Taking? Authorizing Provider   Adderall XR 30 MG 24 hr capsule Take 1 capsule by mouth Daily 1/28/22  Yes Emergency, Nurse Rupinder, RN   amphetamine-dextroamphetamine (ADDERALL) 20 MG tablet Take 1 tablet by mouth Every Afternoon. 8/16/23  Yes Provider, MD Marisabel   ondansetron ODT (ZOFRAN-ODT) 4 MG disintegrating tablet Place 1 tablet on the tongue 4 (Four) Times a Day As Needed for Nausea or Vomiting. 8/23/23  Yes Laurel Mayfield APRN   oxyCODONE (ROXICODONE) 5 MG immediate release tablet Take 1 tablet by mouth Every 4 (Four) Hours As Needed for Moderate Pain or Severe Pain. 9/26/23  Yes Dejah Hussein MD   docusate sodium (COLACE) 100 MG capsule Take 1 capsule by mouth Daily As Needed for Constipation. 9/26/23   Dejah Hussein MD   ibuprofen (ADVIL,MOTRIN) 600 MG tablet Take 1 tablet by mouth Every 6 (Six) Hours As Needed for Moderate Pain for up to 5 days. 9/26/23 10/1/23  Dejah Hussein MD      Current medications:  acetaminophen, 650 mg, Oral, Q6H  famotidine, 20 mg, Oral, BID AC      Current IV drips:  dextrose 5 % and sodium chloride 0.45 % with KCl 20 mEq/L, 100 mL/hr, Last Rate: 100 mL/hr (09/26/23 1159)        Review of Systems   Constitutional: Negative for chills, fever and weight loss.   HENT:  Negative for congestion and nosebleeds.    Cardiovascular:  Positive for near-syncope and syncope. Negative  "for orthopnea and paroxysmal nocturnal dyspnea.   Respiratory:  Negative for cough and shortness of breath.    Endocrine: Negative for cold intolerance and heat intolerance.   Skin:  Negative for rash.   Musculoskeletal:  Negative for back pain and muscle weakness.   Gastrointestinal:  Negative for abdominal pain, nausea and vomiting.   Genitourinary:  Negative for dysuria and nocturia.   Neurological:  Negative for dizziness and light-headedness.   Psychiatric/Behavioral:  Negative for altered mental status and hallucinations.        Physical Exam    /53 (BP Location: Right arm, Patient Position: Lying)   Pulse 82   Temp 98.4 °F (36.9 °C) (Oral)   Resp 16   Ht 152.4 cm (60\")   Wt 52.6 kg (115 lb 15.4 oz)   SpO2 97%   BMI 22.65 kg/m²  Body mass index is 22.65 kg/m².   Oxygen saturation   @FLOWAN(10::1)@ SpO2  Min: 95 %  Max: 100 %    General Appearance:   no acute distress  Alert and oriented x3  HENT:   lips not cyanotic  Atraumatic  Neck:  thyroid not enlarged  supple  Respiratory:  no respiratory distress  normal breath sounds  no rales  Cardiovascular:  no jugular venous distention  regular rhythm  apical impulse normal  S1 normal, S2 normal  no S3, no S4   no murmur  no rub, no thrill  no carotid bruit  pedal pulses normal  lower extremity edema: none    Gastrointestinal:   bowel sounds normal  non-tender  no hepatomegaly, no splenomegaly  Musculoskeletal:  no clubbing of fingers.   normocephalic, head atraumatic  Skin:   warm, dry  No rashes  Neuro/Psychiatric:  normal mood and affect  judgement and insight appropriate      Cardiographics:     ECG  (personally reviewed)    Telemetry:  (personally reviewed) normal sinus rhythm sinus bradycardia with sinus arrhythmia and occasional PACs   Results for orders placed during the hospital encounter of 09/26/23    Adult Transthoracic Echo Complete W/ Cont if Necessary Per Protocol    Interpretation Summary    Left ventricular systolic function is normal. " Calculated left ventricular EF = 65.6%    Left ventricular diastolic function was normal.    Estimated right ventricular systolic pressure from tricuspid regurgitation is normal (<35 mmHg).         No results found for this or any previous visit.      Cardiolite (Tc-99m Sestamibi) stress test   Lab Review:       CBC          8/23/2023    18:40 9/26/2023    20:41 9/27/2023    08:54   CBC   WBC 14.39  7.49  12.49    RBC 4.56  4.08  3.95    Hemoglobin 13.1  11.2  11.1    Hematocrit 39.2  35.2  34.2    MCV 86.0  86.3  86.6    MCH 28.7  27.5  28.1    MCHC 33.4  31.8  32.5    RDW 14.5  13.8  13.7    Platelets 210  177  176        CMP          8/23/2023    18:40 9/26/2023    20:41   CMP   Glucose 102  178    BUN 4  4    Creatinine 0.77  0.70    EGFR 106.6  119.5    Sodium 136  135    Potassium 3.4  4.7    Chloride 100  103    Calcium 8.9  8.5    Total Protein 7.3  5.9    Albumin 4.4  3.9    Globulin 2.9  2.0    Total Bilirubin 0.5  0.5    Alkaline Phosphatase 90  61    AST (SGOT) 23  17    ALT (SGPT) 29  12    Albumin/Globulin Ratio 1.5  2.0    BUN/Creatinine Ratio 5.2  5.7    Anion Gap 12.0  8.1         CARDIAC LABS:       No results found for: DIGOXIN   Lab Results   Component Value Date    TSH 0.741 09/26/2023           Invalid input(s): LDLCALC  No results found for: POCTROP  No components found for: DDIMERQUAN  Lab Results   Component Value Date    MG 1.9 09/26/2023             CARDIAC LABS:         Assessment:    Chronic pelvic pain in female    Bradycardia, sinus    Post-operative state      Sinus bradycardia asymptomatic no other dysrhythmias noted previous syncope presyncopal episode sound like vasovagal syncope and orthostasis counseled patient on avoidance of dehydration, compression stockings and countermeasures.  Recommend outpatient 48-hour Holter monitor and follow-up    Plan:  1.  Outpatient 48-hour Holter monitor patient be discharged and follow-up in clinic      Thank you for allowing us to share in  Chelsy Lopez [facility].            Cam Ulloa MD   9/27/2023    09:14 EDT

## 2023-09-27 NOTE — PLAN OF CARE
Goal Outcome Evaluation:              Outcome Evaluation: Patient medicated once with Toradol this shift for abdominal pain. Receiving scheduled Tylenol. Patient has slept well. No c/o dizziness, CP, syncope. BP stable, HR ranging from 37-65.

## 2023-09-27 NOTE — DISCHARGE SUMMARY
Date of Discharge:  9/27/2023    Discharge Diagnosis: TLH, chronic pelvic pain, fibroid; bradycardia     Presenting Problem/History of Present Illness  Active Hospital Problems    Diagnosis  POA    **Chronic pelvic pain in female [R10.2, G89.29]  Yes     Priority: High    Bradycardia, sinus [R00.1]  Unknown    Post-operative state [Z98.890]  Not Applicable      Resolved Hospital Problems    Diagnosis Date Resolved POA    Uterine leiomyoma [D25.9] 09/26/2023 Yes     Priority: High          Hospital Course  Patient is a 28 y.o. female presented with  postop TLH uncomplicated.  Was scheduled for same-day discharge.  During her extended recovery  phase she was noted to have bradycardia.  During the course of the evaluation it was determined that she has had several near syncopal and syncopal episodes and ER visits within the past year.  She had evaluations by the hospitalist and cardiology.  there are relevant findings are available in their respective notes and studies.  The cardiologist has advised 48-hour Holter monitoring with outpatient follow-up.  She has met the expected postop milestones, tolerating p.o. foods and fluids, voiding, passing gas and adequate pain management with minimal vaginal bleeding.  Incisions are clean dry and intact.   Pathology report is pending. Discharge instructions have been reviewed with the patient.  She will maintain pelvic rest.  Showers only no tub baths or sexual intercourse.  Follow-up in office in 2 weeks.      Procedures Performed    Procedure(s):  TOTAL LAPAROSCOPIC HYSTERECTOMY  CYSTOSCOPY  -------------------       Consults:   Consults       Date and Time Order Name Status Description    9/27/2023  6:12 AM Inpatient Cardiology Consult      9/26/2023  5:44 PM Inpatient Hospitalist Consult              Pertinent Test Results: labs:   See report in epic, cardiac graphics: ECG:  bradycardia, see additional reports in epic, and  surgical pathology pending    Condition on  Discharge:   stable    Vital Signs  Temp:  [97.6 °F (36.4 °C)-99 °F (37.2 °C)] 99 °F (37.2 °C)  Heart Rate:  [43-95] 89  Resp:  [16-18] 18  BP: ()/(43-64) 101/53    Physical Exam:     General Appearance:    Alert, cooperative, in no acute distress   Head:    Normocephalic, without obvious abnormality, atraumatic   Eyes:            Lids and lashes normal, conjunctivae and sclerae normal, no   icterus, no pallor, corneas clear, PERRLA   Ears:    Ears appear intact with no abnormalities noted   Throat:   No oral lesions, no thrush, oral mucosa moist   Neck:   No adenopathy, supple, trachea midline, no thyromegaly, no     carotid bruit, no JVD   Back:     No kyphosis present, no scoliosis present, no skin lesions,       erythema or scars, no tenderness to percussion or                   palpation,   range of motion normal   Lungs:     Clear to auscultation,respirations regular, even and                   unlabored    Heart:    Regular rhythm and normal rate, normal S1 and S2, no            murmur, no gallop, no rub, no click   Breast Exam:    Deferred   Abdomen:     Normal bowel sounds, no masses, no organomegaly, soft        non-tender, non-distended, no guarding, no rebound                 tenderness   Genitalia:    Deferred   Extremities:   Moves all extremities well, no edema, no cyanosis, no              redness   Pulses:   Pulses palpable and equal bilaterally   Skin:   No bleeding, bruising or rash   Lymph nodes:   No palpable adenopathy   Neurologic:   Cranial nerves 2 - 12 grossly intact, sensation intact, DTR        present and equal bilaterally       Discharge Disposition  Home or Self Care    Discharge Medications     Discharge Medications        New Medications        Instructions Start Date   docusate sodium 100 MG capsule  Commonly known as: COLACE   100 mg, Oral, Daily PRN      ibuprofen 600 MG tablet  Commonly known as: ADVIL,MOTRIN   600 mg, Oral, Every 6 Hours PRN      oxyCODONE 5 MG immediate  release tablet  Commonly known as: ROXICODONE   5 mg, Oral, Every 4 Hours PRN             Changes to Medications        Instructions Start Date   amphetamine-dextroamphetamine 20 MG tablet  Commonly known as: ADDERALL  What changed: Another medication with the same name was removed. Continue taking this medication, and follow the directions you see here.   20 mg, Oral, Every Afternoon             Stop These Medications      ondansetron ODT 4 MG disintegrating tablet  Commonly known as: ZOFRAN-ODT              Discharge Diet:     Activity at Discharge:   Activity Instructions       Bathing Restrictions      Until provider cleared.    Type of Restriction: Bathing    Bathing Restrictions: No Tub Bath    Driving Restrictions      Type of Restriction: Driving    Driving Restrictions: No Driving While Taking Narcotics    Gradually Increase Activity Until at Pre-Hospitalization Level      Lifting Restrictions      Type of Restriction: Lifting    Lifting Restrictions: No Lifting Until Cleared By Provider    Sexual Activity Restrictions      Type of Restriction: Sex    Explain Sexual Activity Restrictions: Until cleared by provider.            Follow-up Appointments  No future appointments.  Additional Instructions for the Follow-ups that You Need to Schedule       Discharge Follow-up with Specified Provider: Dr. Hussein; 2 Weeks   As directed      To: Dr. Hussein   Follow Up: 2 Weeks   Follow Up Details: Appointment                Test Results Pending at Discharge  Pending Labs       Order Current Status    Tissue Pathology Exam In process             Dejah Hussein MD  09/27/23  12:37 EDT    Time: Discharge 30 min

## 2023-09-28 LAB
CYTO UR: NORMAL
LAB AP CASE REPORT: NORMAL
LAB AP CLINICAL INFORMATION: NORMAL
PATH REPORT.FINAL DX SPEC: NORMAL
PATH REPORT.GROSS SPEC: NORMAL
QT INTERVAL: 467 MS
QTC INTERVAL: 393 MS

## 2023-10-24 ENCOUNTER — OFFICE VISIT (OUTPATIENT)
Dept: FAMILY MEDICINE CLINIC | Facility: CLINIC | Age: 28
End: 2023-10-24
Payer: COMMERCIAL

## 2023-10-24 VITALS
SYSTOLIC BLOOD PRESSURE: 102 MMHG | BODY MASS INDEX: 21.75 KG/M2 | OXYGEN SATURATION: 99 % | TEMPERATURE: 98.4 F | WEIGHT: 110.8 LBS | HEIGHT: 60 IN | HEART RATE: 111 BPM | DIASTOLIC BLOOD PRESSURE: 62 MMHG

## 2023-10-24 DIAGNOSIS — R00.2 HEART PALPITATIONS: Primary | ICD-10-CM

## 2023-10-24 DIAGNOSIS — R73.9 ELEVATED BLOOD SUGAR LEVEL: ICD-10-CM

## 2023-10-24 DIAGNOSIS — R55 SYNCOPE, UNSPECIFIED SYNCOPE TYPE: ICD-10-CM

## 2023-10-24 PROCEDURE — 99214 OFFICE O/P EST MOD 30 MIN: CPT

## 2023-10-24 NOTE — PROGRESS NOTES
Chief Complaint  Chief Complaint   Patient presents with    [facility]       HPI:  Chelsy Patterson presents to [facility]    28-year-old Chelsy Patterson presents today to establish care.      Patient states that she has been having issues with her heart rate dropping when she is moving.  She states that she is supposed to see a cardiologist in December.  Patient states that she is very sensitive to temperature changes and getting up and down will almost feel like she is about to pass out.    Patient does state that she had gestational diabetes.  She is adopted and does not know her complete family history.     Procedures     Past History:  Medical History: has a past medical history of ADHD, Anemia, Anxiety, Chlamydia, Chronic post-traumatic stress disorder (PTSD), Diverticulitis, Fibroid, Gonorrhea, PCOS (polycystic ovarian syndrome), PONV (postoperative nausea and vomiting), and Scoliosis.   Surgical History: has a past surgical history that includes Salpingectomy; total laparoscopic hysterectomy (N/A, 9/26/2023); and Cystoscopy (N/A, 9/26/2023).   Family History: family history includes Breast cancer in her maternal grandmother. She was adopted.   Social History: reports that she has been smoking cigarettes. She has a 7.50 pack-year smoking history. She has never used smokeless tobacco. She reports current alcohol use. She reports current drug use. Frequency: 5.00 times per week.    There is no immunization history on file for this patient.      Allergies: Bentyl [dicyclomine]     Medications:  Current Outpatient Medications on File Prior to Visit   Medication Sig Dispense Refill    amphetamine-dextroamphetamine (ADDERALL) 20 MG tablet Take 1 tablet by mouth Every Afternoon.       No current facility-administered medications on file prior to visit.        Health Maintenance Due   Topic Date Due    COVID-19 Vaccine (1) Never done    Pneumococcal Vaccine 0-64 (1 - PCV)  "Never done    TDAP/TD VACCINES (1 - Tdap) Never done    HEPATITIS C SCREENING  Never done    ANNUAL PHYSICAL  Never done    INFLUENZA VACCINE  Never done       Vital Signs:   Vitals:    10/24/23 1247   BP: 102/62   Pulse: 111   Temp: 98.4 °F (36.9 °C)   SpO2: 99%   Weight: 50.3 kg (110 lb 12.8 oz)   Height: 152.4 cm (60\")      Body mass index is 21.64 kg/m².     ROS:  Review of Systems       Physical Exam     Result Review        Diagnoses and all orders for this visit:    1. Heart palpitations (Primary)  Comments:  Have referred patient for stress test and holter monitor.  Orders:  -     Holter monitor - 48 hour; Future  -     Treadmill Stress Test; Future    2. Elevated blood sugar level  Comments:  Patient will have lab work done.  Orders:  -     Hemoglobin A1c; Future    3. Syncope, unspecified syncope type  Comments:  Have referred patient for stress test and holter monitor.  Orders:  -     Treadmill Stress Test; Future                Follow Up   Return in about 3 months (around 1/24/2024) for Recheck.  Patient was given instructions and counseling regarding her condition or for health maintenance advice. Please see specific information pulled into the AVS if appropriate.       JENNIFER Lind  "

## 2023-11-13 ENCOUNTER — HOSPITAL ENCOUNTER (OUTPATIENT)
Dept: CARDIOLOGY | Facility: HOSPITAL | Age: 28
Discharge: HOME | End: 2023-11-13
Payer: COMMERCIAL

## 2023-11-13 DIAGNOSIS — R00.2 HEART PALPITATIONS: ICD-10-CM

## 2023-11-13 DIAGNOSIS — R55 SYNCOPE, UNSPECIFIED SYNCOPE TYPE: ICD-10-CM

## 2023-11-13 LAB
BH CV IMMEDIATE POST RECOVERY TECH DATA SYMPTOMS: NORMAL
BH CV IMMEDIATE POST TECH DATA BLOOD PRESSURE: NORMAL MMHG
BH CV IMMEDIATE POST TECH DATA HEART RATE: 179 BPM
BH CV IMMEDIATE POST TECH DATA OXYGEN SATS: 98 %
BH CV NINE MINUTE RECOVERY TECH DATA BLOOD PRESSURE: NORMAL MMHG
BH CV NINE MINUTE RECOVERY TECH DATA HEART RATE: 109 BPM
BH CV SIX MINUTE RECOVERY TECH DATA BLOOD PRESSURE: NORMAL
BH CV SIX MINUTE RECOVERY TECH DATA HEART RATE: 115 BPM
BH CV SIX MINUTE RECOVERY TECH DATA SYMPTOMS: NORMAL
BH CV STRESS BP STAGE 1: NORMAL
BH CV STRESS BP STAGE 2: NORMAL
BH CV STRESS BP STAGE 3: NORMAL
BH CV STRESS BP STAGE 4: NORMAL
BH CV STRESS DURATION MIN STAGE 1: 3
BH CV STRESS DURATION MIN STAGE 2: 3
BH CV STRESS DURATION MIN STAGE 3: 3
BH CV STRESS DURATION MIN STAGE 4: 0
BH CV STRESS DURATION SEC STAGE 1: 0
BH CV STRESS DURATION SEC STAGE 2: 0
BH CV STRESS DURATION SEC STAGE 3: 0
BH CV STRESS DURATION SEC STAGE 4: 40
BH CV STRESS GRADE STAGE 1: 10
BH CV STRESS GRADE STAGE 2: 12
BH CV STRESS GRADE STAGE 3: 14
BH CV STRESS GRADE STAGE 4: 16
BH CV STRESS HR STAGE 1: 133
BH CV STRESS HR STAGE 2: 148
BH CV STRESS HR STAGE 3: 171
BH CV STRESS HR STAGE 4: 182
BH CV STRESS METS STAGE 1: 5
BH CV STRESS METS STAGE 2: 7.5
BH CV STRESS METS STAGE 3: 10
BH CV STRESS METS STAGE 4: 13.5
BH CV STRESS PROTOCOL 1: NORMAL
BH CV STRESS RECOVERY BP: NORMAL MMHG
BH CV STRESS RECOVERY HR: 109 BPM
BH CV STRESS RECOVERY O2: 97 %
BH CV STRESS SPEED STAGE 1: 1.7
BH CV STRESS SPEED STAGE 2: 2.5
BH CV STRESS SPEED STAGE 3: 3.4
BH CV STRESS SPEED STAGE 4: 4.2
BH CV STRESS STAGE 1: 1
BH CV STRESS STAGE 2: 2
BH CV STRESS STAGE 3: 3
BH CV STRESS STAGE 4: 4
BH CV THREE MINUTE POST TECH DATA BLOOD PRESSURE: NORMAL MMHG
BH CV THREE MINUTE POST TECH DATA HEART RATE: 129 BPM
MAXIMAL PREDICTED HEART RATE: 190 BPM
PERCENT MAX PREDICTED HR: 98.95 %
STRESS BASELINE BP: NORMAL MMHG
STRESS BASELINE HR: 87 BPM
STRESS O2 SAT REST: 99 %
STRESS PERCENT HR: 116 %
STRESS POST ESTIMATED WORKLOAD: 11.3 METS
STRESS POST EXERCISE DUR MIN: 9 MIN
STRESS POST EXERCISE DUR SEC: 40 SEC
STRESS POST O2 SAT PEAK: 99 %
STRESS POST PEAK BP: NORMAL MMHG
STRESS POST PEAK HR: 188 BPM
STRESS TARGET HR: 162 BPM

## 2023-11-13 PROCEDURE — 93017 CV STRESS TEST TRACING ONLY: CPT

## 2023-11-13 PROCEDURE — 93018 CV STRESS TEST I&R ONLY: CPT | Performed by: INTERNAL MEDICINE

## 2023-12-21 PROBLEM — Z98.890 POST-OPERATIVE STATE: Status: RESOLVED | Noted: 2023-09-26 | Resolved: 2023-12-21

## 2023-12-26 ENCOUNTER — TELEPHONE (OUTPATIENT)
Dept: CARDIOLOGY | Facility: CLINIC | Age: 28
End: 2023-12-26

## 2023-12-26 NOTE — TELEPHONE ENCOUNTER
Hub staff attempted to follow warm transfer process and was unsuccessful     Caller: Chelsy Patterson    Relationship to patient: Self    Best call back number [phone]    Patient is needing: PATIENT IS CALLING TO RESCHEDULE AN APPT THAT YOU LEFT A VOICEMAIL ABOUT WANTING HER TO RESCHEDULE AFTER 12/28/23. PLEASE CALL BACK

## 2023-12-28 ENCOUNTER — HOSPITAL ENCOUNTER (OUTPATIENT)
Dept: CARDIOLOGY | Facility: HOSPITAL | Age: 28
Discharge: HOME | End: 2023-12-28
Payer: COMMERCIAL

## 2023-12-28 DIAGNOSIS — R00.2 HEART PALPITATIONS: ICD-10-CM

## 2023-12-28 PROCEDURE — 93225 XTRNL ECG REC<48 HRS REC: CPT

## 2024-01-04 ENCOUNTER — OFFICE VISIT (OUTPATIENT)
Dept: CARDIOLOGY | Facility: CLINIC | Age: 29
End: 2024-01-04
Payer: COMMERCIAL

## 2024-01-04 VITALS
WEIGHT: 112 LBS | BODY MASS INDEX: 21.99 KG/M2 | DIASTOLIC BLOOD PRESSURE: 67 MMHG | SYSTOLIC BLOOD PRESSURE: 112 MMHG | HEIGHT: 60 IN | HEART RATE: 100 BPM

## 2024-01-04 DIAGNOSIS — R42 POSTURAL DIZZINESS WITH PRESYNCOPE: ICD-10-CM

## 2024-01-04 DIAGNOSIS — R55 POSTURAL DIZZINESS WITH PRESYNCOPE: ICD-10-CM

## 2024-01-04 DIAGNOSIS — R00.1 BRADYCARDIA, SINUS: Primary | ICD-10-CM

## 2024-01-04 NOTE — PROGRESS NOTES
Chief Complaint  Hospital Follow Up Visit    Subjective            History of Present Illness  Chelsy Patterson is a 28-year-old female patient who presents to the office today for hospital follow-up.  She was admitted to [facility] from 9/26/2023 to 9/27/2023 for postoperative complications.  She was noted to be bradycardic and reported syncopal symptoms over the past year.  At that time cardiology was consulted and 48-hour Holter monitor was ordered for outpatient follow-up.  The 48-hour Holter monitor showed average heart rate of 94 bpm and rare ectopies.  She had prior echocardiogram on 9/27/2023 which showed normal heart function.  Today she reports symptoms of dizziness, nausea, and sensitivity to temperatures like a hot shower that require her to sit down or she feels like she will pass out.  She reports that the symptoms occur about 2 to 3 days and an average week and mostly occurs with strenuous activity.  She has been experiencing these types of symptoms for the past 2 to 3 years.    PMH  Past Medical History:   Diagnosis Date    ADHD     Anemia     Anxiety     Chlamydia     Chronic post-traumatic stress disorder (PTSD)     no issues coming out of anesthesia    Diverticulitis     Fibroid     Gonorrhea     PCOS (polycystic ovarian syndrome)     PONV (postoperative nausea and vomiting)     Scoliosis     neck         ALLERGY  Allergies   Allergen Reactions    Bentyl [Dicyclomine] Nausea And Vomiting          SURGICALHX  Past Surgical History:   Procedure Laterality Date    CYSTOSCOPY N/A 9/26/2023    Procedure: CYSTOSCOPY;  Surgeon: Dejah Hussein MD;  Location: [facility];  Service: Gynecology;  Laterality: N/A;    SALPINGECTOMY      TOTAL LAPAROSCOPIC HYSTERECTOMY N/A 9/26/2023    Procedure: TOTAL LAPAROSCOPIC HYSTERECTOMY;  Surgeon: Dejah Hussein MD;  Location: [facility] OR;  Service: Gynecology;  Laterality: N/A;          SOC  Social History     Socioeconomic History  "   Marital status:    Tobacco Use    Smoking status: Every Day     Packs/day: 0.50     Years: 15.00     Additional pack years: 0.00     Total pack years: 7.50     Types: Cigarettes    Smokeless tobacco: Never   Vaping Use    Vaping Use: Some days    Substances: Nicotine    Devices: Disposable   Substance and Sexual Activity    Alcohol use: Yes     Comment: rarely    Drug use: Yes     Frequency: 5.0 times per week     Comment: THC    Sexual activity: Yes     Partners: Male     Birth control/protection: Tubal ligation         FAMHX  Family History   Adopted: Yes   Problem Relation Age of Onset    Breast cancer Maternal Grandmother     Colon cancer Neg Hx     Melanoma Neg Hx     Pulmonary embolism Neg Hx     Deep vein thrombosis Neg Hx     Uterine cancer Neg Hx     Ovarian cancer Neg Hx           MEDSIGONLY  Current Outpatient Medications on File Prior to Visit   Medication Sig    amphetamine-dextroamphetamine (ADDERALL) 20 MG tablet Take 1 tablet by mouth Every Afternoon.     No current facility-administered medications on file prior to visit.         Objective   /67   Pulse 100   Ht 152.4 cm (60\")   Wt 50.8 kg (112 lb)   BMI 21.87 kg/m²       Physical Exam  Constitutional:       Appearance: She is normal weight.   HENT:      Head: Normocephalic.   Neck:      Vascular: No carotid bruit.   Cardiovascular:      Rate and Rhythm: Regular rhythm. Tachycardia present.      Pulses: Normal pulses.      Heart sounds: Normal heart sounds. No murmur heard.  Pulmonary:      Effort: Pulmonary effort is normal.      Breath sounds: Normal breath sounds.   Musculoskeletal:      Cervical back: Neck supple.      Right lower leg: No edema.      Left lower leg: No edema.   Skin:     General: Skin is dry.   Neurological:      Mental Status: She is alert and oriented to person, place, and time.   Psychiatric:         Behavior: Behavior normal.       Result Review :   The following data was reviewed by: Ruma Castorena, " "APRN on 01/04/2024:  No results found for: \"PROBNP\"  CMP          9/27/2023    08:54   CMP   Glucose 99    BUN 6    Creatinine 0.74    EGFR 111.8    Sodium 137    Potassium 4.3    Chloride 106    Calcium 8.5    Total Protein 5.6    Albumin 3.4    Globulin 2.2    Total Bilirubin 0.5    Alkaline Phosphatase 57    AST (SGOT) 18    ALT (SGPT) 13    Albumin/Globulin Ratio 1.5    BUN/Creatinine Ratio 8.1    Anion Gap 8.3      CBC w/diff          9/27/2023    08:54   CBC w/Diff   WBC 12.49    RBC 3.95    Hemoglobin 11.1    Hematocrit 34.2    MCV 86.6    MCH 28.1    MCHC 32.5    RDW 13.7    Platelets 176    Neutrophil Rel % 69.9    Immature Granulocyte Rel % 0.4    Lymphocyte Rel % 19.4    Monocyte Rel % 9.8    Eosinophil Rel % 0.1    Basophil Rel % 0.4       Lab Results   Component Value Date    TSH 0.741 09/26/2023      Lab Results   Component Value Date    FREET4 1.25 06/15/2022      No results found for: \"DDIMERQUANT\"  Magnesium   Date Value Ref Range Status   09/27/2023 2.1 1.6 - 2.6 mg/dL Final      No results found for: \"DIGOXIN\"   Lab Results   Component Value Date    TROPONINT <0.010 01/20/2022           Results for orders placed during the hospital encounter of 09/26/23    Adult Transthoracic Echo Complete W/ Cont if Necessary Per Protocol    Interpretation Summary    Left ventricular systolic function is normal. Calculated left ventricular EF = 65.6%    Left ventricular diastolic function was normal.    Estimated right ventricular systolic pressure from tricuspid regurgitation is normal (<35 mmHg).           Assessment and Plan    Diagnoses and all orders for this visit:    1. Bradycardia, sinus (Primary)  She has an elevated average heart rate however blood pressures are pretty low so would be very cautious of beta-blocker therapy.    2. Postural dizziness with presyncope  Obtain tilt table test to assess for vasovagal versus cardiogenic versus orthostatic syncope.  -     Tilt Table; Future            Follow Up "   Return in about 4 months (around 5/4/2024) for Follow up with Dr Ulloa.    Patient was given instructions and counseling regarding her condition or for health maintenance advice. Please see specific information pulled into the AVS if appropriate.     Chelsy Patterson  reports that she has been smoking cigarettes. She has a 7.50 pack-year smoking history. She has never used smokeless tobacco.. I have educated her on the risk of diseases from using tobacco products such as cancer, COPD, and heart disease.     I advised her to quit and she is not willing to quit.    I spent 3  minutes counseling the patient.           Ruma Castorena, APRN  01/04/24  14:18 EST    Dictated Utilizing Dragon Dictation

## 2024-01-09 ENCOUNTER — HOSPITAL ENCOUNTER (OUTPATIENT)
Dept: CARDIOLOGY | Facility: HOSPITAL | Age: 29
Discharge: HOME | End: 2024-01-09
Payer: COMMERCIAL

## 2024-01-09 VITALS — HEIGHT: 60 IN | WEIGHT: 110 LBS | BODY MASS INDEX: 21.6 KG/M2

## 2024-01-09 DIAGNOSIS — R42 POSTURAL DIZZINESS WITH PRESYNCOPE: ICD-10-CM

## 2024-01-09 DIAGNOSIS — R55 POSTURAL DIZZINESS WITH PRESYNCOPE: ICD-10-CM

## 2024-01-09 PROCEDURE — 93660 TILT TABLE EVALUATION: CPT

## 2024-01-09 NOTE — DISCHARGE INSTRUCTIONS
Cardiovascular Services Phone Number: [phone]    Normal activities may be resumed after you are released from the hospital.  Normal consumption of foods and liquids may be resumed immediately after the study.  Contact your physician who directed your tilt table study if you experience any symptoms again.  Resume your medications, following your doctor's instructions.      In the event of an emergency, call 911 or go to your nearest emergency room.

## 2024-01-16 ENCOUNTER — TELEPHONE (OUTPATIENT)
Dept: CARDIOLOGY | Facility: CLINIC | Age: 29
End: 2024-01-16
Payer: COMMERCIAL

## 2024-01-16 NOTE — TELEPHONE ENCOUNTER
----- Message from JENNIFER Douglas sent at 1/15/2024 12:54 PM EST -----  Tilt table test is negative, follow up as scheduled, notify office if symptoms persist/worsen

## 2024-05-08 ENCOUNTER — OFFICE VISIT (OUTPATIENT)
Dept: CARDIOLOGY | Facility: CLINIC | Age: 29
End: 2024-05-08
Payer: COMMERCIAL

## 2024-05-08 VITALS
HEART RATE: 92 BPM | WEIGHT: 107 LBS | HEIGHT: 60 IN | DIASTOLIC BLOOD PRESSURE: 87 MMHG | SYSTOLIC BLOOD PRESSURE: 105 MMHG | BODY MASS INDEX: 21.01 KG/M2

## 2024-05-08 DIAGNOSIS — R00.1 BRADYCARDIA, SINUS: Primary | ICD-10-CM

## 2024-05-08 PROCEDURE — 99213 OFFICE O/P EST LOW 20 MIN: CPT | Performed by: INTERNAL MEDICINE

## 2024-05-08 RX ORDER — DEXTROAMPHETAMINE SACCHARATE, AMPHETAMINE ASPARTATE MONOHYDRATE, DEXTROAMPHETAMINE SULFATE AND AMPHETAMINE SULFATE 7.5; 7.5; 7.5; 7.5 MG/1; MG/1; MG/1; MG/1
30 CAPSULE, EXTENDED RELEASE ORAL EVERY MORNING
COMMUNITY
Start: 2024-05-05

## 2024-05-08 RX ORDER — DEXTROAMPHETAMINE SACCHARATE, AMPHETAMINE ASPARTATE, DEXTROAMPHETAMINE SULFATE AND AMPHETAMINE SULFATE 7.5; 7.5; 7.5; 7.5 MG/1; MG/1; MG/1; MG/1
30 TABLET ORAL DAILY
COMMUNITY
Start: 2024-05-01

## 2024-10-10 ENCOUNTER — HOSPITAL ENCOUNTER (EMERGENCY)
Facility: HOSPITAL | Age: 29
Discharge: HOME | End: 2024-10-10
Attending: EMERGENCY MEDICINE
Payer: COMMERCIAL

## 2024-10-10 ENCOUNTER — APPOINTMENT (OUTPATIENT)
Dept: GENERAL RADIOLOGY | Facility: HOSPITAL | Age: 29
End: 2024-10-10
Payer: COMMERCIAL

## 2024-10-10 ENCOUNTER — APPOINTMENT (OUTPATIENT)
Dept: CT IMAGING | Facility: HOSPITAL | Age: 29
End: 2024-10-10
Payer: COMMERCIAL

## 2024-10-10 VITALS
BODY MASS INDEX: 22.03 KG/M2 | HEART RATE: 125 BPM | OXYGEN SATURATION: 100 % | WEIGHT: 112.21 LBS | DIASTOLIC BLOOD PRESSURE: 71 MMHG | HEIGHT: 60 IN | TEMPERATURE: 98.3 F | SYSTOLIC BLOOD PRESSURE: 116 MMHG | RESPIRATION RATE: 16 BRPM

## 2024-10-10 DIAGNOSIS — S16.1XXA STRAIN OF NECK MUSCLE, INITIAL ENCOUNTER: Primary | ICD-10-CM

## 2024-10-10 DIAGNOSIS — M25.551 RIGHT HIP PAIN: ICD-10-CM

## 2024-10-10 DIAGNOSIS — M25.512 ACUTE PAIN OF BOTH SHOULDERS: ICD-10-CM
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
## 2024-10-10 DIAGNOSIS — M25.511 ACUTE PAIN OF BOTH SHOULDERS: ICD-10-CM

## 2024-10-10 PROCEDURE — 99284 EMERGENCY DEPT VISIT MOD MDM: CPT

## 2024-10-10 PROCEDURE — 72125 CT NECK SPINE W/O DYE: CPT

## 2024-10-10 PROCEDURE — 73502 X-RAY EXAM HIP UNI 2-3 VIEWS: CPT

## 2024-10-10 PROCEDURE — 73030 X-RAY EXAM OF SHOULDER: CPT

## 2024-10-10 PROCEDURE — 70100 X-RAY EXAM OF JAW <4VIEWS: CPT

## 2024-10-10 RX ORDER — CYCLOBENZAPRINE HCL 10 MG
10 TABLET ORAL 3 TIMES DAILY PRN
Qty: 15 TABLET | Refills: 0 | Status: SHIPPED | OUTPATIENT
Start: 2024-10-10
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
## 2024-11-05 ENCOUNTER — APPOINTMENT (OUTPATIENT)
Dept: CT IMAGING | Facility: HOSPITAL | Age: 29
End: 2024-11-05
Payer: COMMERCIAL

## 2024-11-05 ENCOUNTER — HOSPITAL ENCOUNTER (EMERGENCY)
Facility: HOSPITAL | Age: 29
Discharge: HOME | End: 2024-11-05
Attending: EMERGENCY MEDICINE | Admitting: EMERGENCY MEDICINE
Payer: COMMERCIAL

## 2024-11-05 VITALS
HEART RATE: 113 BPM | HEIGHT: 60 IN | WEIGHT: 113.1 LBS | BODY MASS INDEX: 22.2 KG/M2 | RESPIRATION RATE: 18 BRPM | DIASTOLIC BLOOD PRESSURE: 72 MMHG | OXYGEN SATURATION: 100 % | SYSTOLIC BLOOD PRESSURE: 111 MMHG | TEMPERATURE: 98.1 F

## 2024-11-05 DIAGNOSIS — A08.4 VIRAL GASTROENTERITIS: Primary | ICD-10-CM

## 2024-11-05 LAB
ALBUMIN SERPL-MCNC: 4.3 G/DL (ref 3.5–5.2)
ALBUMIN/GLOB SERPL: 1.4 G/DL
ALP SERPL-CCNC: 90 U/L (ref 39–117)
ALT SERPL W P-5'-P-CCNC: 16 U/L (ref 1–33)
ANION GAP SERPL CALCULATED.3IONS-SCNC: 12.1 MMOL/L (ref 5–15)
AST SERPL-CCNC: 17 U/L (ref 1–32)
BASOPHILS # BLD AUTO: 0.04 10*3/MM3 (ref 0–0.2)
BASOPHILS NFR BLD AUTO: 0.6 % (ref 0–1.5)
BILIRUB SERPL-MCNC: 0.3 MG/DL (ref 0–1.2)
BILIRUB UR QL STRIP: NEGATIVE
BUN SERPL-MCNC: 5 MG/DL (ref 6–20)
BUN/CREAT SERPL: 6 (ref 7–25)
CALCIUM SPEC-SCNC: 9.1 MG/DL (ref 8.6–10.5)
CHLORIDE SERPL-SCNC: 99 MMOL/L (ref 98–107)
CLARITY UR: CLEAR
CO2 SERPL-SCNC: 23.9 MMOL/L (ref 22–29)
COLOR UR: YELLOW
CREAT SERPL-MCNC: 0.83 MG/DL (ref 0.57–1)
DEPRECATED RDW RBC AUTO: 41 FL (ref 37–54)
EGFRCR SERPLBLD CKD-EPI 2021: 96.8 ML/MIN/1.73
EOSINOPHIL # BLD AUTO: 0.02 10*3/MM3 (ref 0–0.4)
EOSINOPHIL NFR BLD AUTO: 0.3 % (ref 0.3–6.2)
ERYTHROCYTE [DISTWIDTH] IN BLOOD BY AUTOMATED COUNT: 13.2 % (ref 12.3–15.4)
FLUAV SUBTYP SPEC NAA+PROBE: NOT DETECTED
FLUBV RNA ISLT QL NAA+PROBE: NOT DETECTED
GLOBULIN UR ELPH-MCNC: 3 GM/DL
GLUCOSE SERPL-MCNC: 96 MG/DL (ref 65–99)
GLUCOSE UR STRIP-MCNC: NEGATIVE MG/DL
HCT VFR BLD AUTO: 45.5 % (ref 34–46.6)
HGB BLD-MCNC: 15 G/DL (ref 12–15.9)
HGB UR QL STRIP.AUTO: NEGATIVE
HOLD SPECIMEN: NORMAL
HOLD SPECIMEN: NORMAL
IMM GRANULOCYTES # BLD AUTO: 0.02 10*3/MM3 (ref 0–0.05)
IMM GRANULOCYTES NFR BLD AUTO: 0.3 % (ref 0–0.5)
KETONES UR QL STRIP: ABNORMAL
LEUKOCYTE ESTERASE UR QL STRIP.AUTO: NEGATIVE
LIPASE SERPL-CCNC: 12 U/L (ref 13–60)
LYMPHOCYTES # BLD AUTO: 0.84 10*3/MM3 (ref 0.7–3.1)
LYMPHOCYTES NFR BLD AUTO: 12.2 % (ref 19.6–45.3)
MCH RBC QN AUTO: 28.1 PG (ref 26.6–33)
MCHC RBC AUTO-ENTMCNC: 33 G/DL (ref 31.5–35.7)
MCV RBC AUTO: 85.2 FL (ref 79–97)
MONOCYTES # BLD AUTO: 0.73 10*3/MM3 (ref 0.1–0.9)
MONOCYTES NFR BLD AUTO: 10.6 % (ref 5–12)
NEUTROPHILS NFR BLD AUTO: 5.22 10*3/MM3 (ref 1.7–7)
NEUTROPHILS NFR BLD AUTO: 76 % (ref 42.7–76)
NITRITE UR QL STRIP: NEGATIVE
NRBC BLD AUTO-RTO: 0 /100 WBC (ref 0–0.2)
PH UR STRIP.AUTO: 6 [PH] (ref 5–8)
PLATELET # BLD AUTO: 206 10*3/MM3 (ref 140–450)
PMV BLD AUTO: 10.4 FL (ref 6–12)
POTASSIUM SERPL-SCNC: 3.6 MMOL/L (ref 3.5–5.2)
PROT SERPL-MCNC: 7.3 G/DL (ref 6–8.5)
PROT UR QL STRIP: NEGATIVE
RBC # BLD AUTO: 5.34 10*6/MM3 (ref 3.77–5.28)
RSV RNA NPH QL NAA+NON-PROBE: NOT DETECTED
SARS-COV-2 RNA RESP QL NAA+PROBE: NOT DETECTED
SODIUM SERPL-SCNC: 135 MMOL/L (ref 136–145)
SP GR UR STRIP: <=1.005 (ref 1–1.03)
UROBILINOGEN UR QL STRIP: ABNORMAL
WBC NRBC COR # BLD AUTO: 6.87 10*3/MM3 (ref 3.4–10.8)
WHOLE BLOOD HOLD COAG: NORMAL
WHOLE BLOOD HOLD SPECIMEN: NORMAL

## 2024-11-05 PROCEDURE — 81003 URINALYSIS AUTO W/O SCOPE: CPT | Performed by: EMERGENCY MEDICINE

## 2024-11-05 PROCEDURE — 25010000002 ONDANSETRON PER 1 MG

## 2024-11-05 PROCEDURE — 87637 SARSCOV2&INF A&B&RSV AMP PRB: CPT | Performed by: EMERGENCY MEDICINE

## 2024-11-05 PROCEDURE — 80053 COMPREHEN METABOLIC PANEL: CPT | Performed by: EMERGENCY MEDICINE

## 2024-11-05 PROCEDURE — 36415 COLL VENOUS BLD VENIPUNCTURE: CPT | Performed by: EMERGENCY MEDICINE

## 2024-11-05 PROCEDURE — 96374 THER/PROPH/DIAG INJ IV PUSH: CPT

## 2024-11-05 PROCEDURE — 85025 COMPLETE CBC W/AUTO DIFF WBC: CPT | Performed by: EMERGENCY MEDICINE

## 2024-11-05 PROCEDURE — 25010000002 KETOROLAC TROMETHAMINE PER 15 MG

## 2024-11-05 PROCEDURE — 96375 TX/PRO/DX INJ NEW DRUG ADDON: CPT

## 2024-11-05 PROCEDURE — 83690 ASSAY OF LIPASE: CPT | Performed by: EMERGENCY MEDICINE

## 2024-11-05 PROCEDURE — 99285 EMERGENCY DEPT VISIT HI MDM: CPT

## 2024-11-05 PROCEDURE — 74177 CT ABD & PELVIS W/CONTRAST: CPT

## 2024-11-05 PROCEDURE — 25510000001 IOPAMIDOL PER 1 ML: Performed by: EMERGENCY MEDICINE

## 2024-11-05 RX ORDER — SODIUM CHLORIDE 0.9 % (FLUSH) 0.9 %
10 SYRINGE (ML) INJECTION AS NEEDED
Status: DISCONTINUED | OUTPATIENT
Start: 2024-11-05 | End: 2024-11-05 | Stop reason: HOSPADM

## 2024-11-05 RX ORDER — ONDANSETRON 4 MG/1
4 TABLET, ORALLY DISINTEGRATING ORAL EVERY 8 HOURS PRN
Qty: 15 TABLET | Refills: 0 | Status: SHIPPED | OUTPATIENT
Start: 2024-11-05 | End: 2024-11-10

## 2024-11-05 RX ORDER — ONDANSETRON 2 MG/ML
4 INJECTION INTRAMUSCULAR; INTRAVENOUS ONCE
Status: COMPLETED | OUTPATIENT
Start: 2024-11-05 | End: 2024-11-05

## 2024-11-05 RX ORDER — KETOROLAC TROMETHAMINE 30 MG/ML
30 INJECTION, SOLUTION INTRAMUSCULAR; INTRAVENOUS ONCE
Status: COMPLETED | OUTPATIENT
Start: 2024-11-05 | End: 2024-11-05

## 2024-11-05 RX ORDER — IOPAMIDOL 755 MG/ML
100 INJECTION, SOLUTION INTRAVASCULAR
Status: COMPLETED | OUTPATIENT
Start: 2024-11-05 | End: 2024-11-05

## 2024-11-05 RX ADMIN — IOPAMIDOL 70 ML: 755 INJECTION, SOLUTION INTRAVENOUS at 18:36

## 2024-11-05 RX ADMIN — ONDANSETRON 4 MG: 2 INJECTION INTRAMUSCULAR; INTRAVENOUS at 18:03

## 2024-11-05 RX ADMIN — KETOROLAC TROMETHAMINE 30 MG: 30 INJECTION, SOLUTION INTRAMUSCULAR; INTRAVENOUS at 18:03

## 2024-11-05 NOTE — ED PROVIDER NOTES
Time: 5:06 PM EST  Date of encounter:  11/5/2024  Independent Historian/Clinical History and Information was obtained by:   Patient    History is limited by: N/A    Chief Complaint   Patient presents with    Abdominal Pain     Abd pain with nausea vomiting x 2 days         History of Present Illness:  Patient is a 29 y.o. year old female who presents to the emergency department for evaluation of nausea/vomiting, abdominal pain, fever/chills for the past 2 days.  Denies painful urination or frequency.  Denies bloody stools.  Patient denies flank pain.    Patient Care Team  Primary Care Provider: Provider, No Known    Past Medical History:     Allergies   Allergen Reactions    Bentyl [Dicyclomine] Nausea And Vomiting     Past Medical History:   Diagnosis Date    ADHD     Anemia     Anxiety     Chlamydia     Chronic post-traumatic stress disorder (PTSD)     no issues coming out of anesthesia    Diverticulitis     Fibroid     Gonorrhea     PCOS (polycystic ovarian syndrome)     PONV (postoperative nausea and vomiting)     Scoliosis     neck     Past Surgical History:   Procedure Laterality Date    CYSTOSCOPY N/A 09/26/2023    Procedure: CYSTOSCOPY;  Surgeon: Dejah Hussein MD;  Location: [facility];  Service: Gynecology;  Laterality: N/A;    SALPINGECTOMY      TOTAL LAPAROSCOPIC HYSTERECTOMY N/A 09/26/2023    Procedure: TOTAL LAPAROSCOPIC HYSTERECTOMY;  Surgeon: Dejah Hussein MD;  Location: [facility];  Service: Gynecology;  Laterality: N/A;     Family History   Adopted: Yes   Problem Relation Age of Onset    Breast cancer Maternal Grandmother     Clotting disorder Father         Regularly hospitalized for blood clots throughout his body    Heart attack Maternal Grandfather     Heart attack Paternal Grandfather     Colon cancer Neg Hx     Melanoma Neg Hx     Pulmonary embolism Neg Hx     Deep vein thrombosis Neg Hx     Uterine cancer Neg Hx     Ovarian cancer Neg Hx        Home Medications:  Prior  "to Admission medications    Medication Sig Start Date End Date Taking? Authorizing Provider   amphetamine-dextroamphetamine (ADDERALL) 30 MG tablet Take 1 tablet by mouth Daily. Takes half tablet daily 5/1/24   Marisabel Major MD   amphetamine-dextroamphetamine XR (ADDERALL XR) 30 MG 24 hr capsule Take 1 capsule by mouth Every Morning 5/5/24   Marisabel Major MD   cyclobenzaprine (FLEXERIL) 10 MG tablet Take 1 tablet by mouth 3 (Three) Times a Day As Needed for Muscle Spasms. 10/10/24   Mariusz Mora PA-C        Social History:   Social History     Tobacco Use    Smoking status: Every Day     Current packs/day: 0.50     Average packs/day: 0.5 packs/day for 15.0 years (7.5 ttl pk-yrs)     Types: Cigarettes    Smokeless tobacco: Never   Vaping Use    Vaping status: Some Days    Substances: Nicotine    Devices: Disposable   Substance Use Topics    Alcohol use: Yes     Comment: occ    Drug use: Yes     Frequency: 5.0 times per week     Comment: THC         Review of Systems:  Review of Systems   Constitutional:  Positive for chills and fever.   HENT:  Negative for congestion, rhinorrhea and sore throat.    Eyes:  Negative for pain and visual disturbance.   Respiratory:  Negative for apnea, cough, chest tightness and shortness of breath.    Cardiovascular:  Negative for chest pain and palpitations.   Gastrointestinal:  Positive for abdominal pain, nausea and vomiting. Negative for diarrhea.   Genitourinary:  Negative for difficulty urinating and dysuria.   Musculoskeletal:  Negative for joint swelling and myalgias.   Skin:  Negative for color change.   Neurological:  Negative for seizures and headaches.   Psychiatric/Behavioral: Negative.     All other systems reviewed and are negative.       Physical Exam:  /72 (BP Location: Right arm, Patient Position: Sitting)   Pulse 113   Temp 98.1 °F (36.7 °C) (Oral)   Resp 18   Ht 152.4 cm (60\")   Wt 51.3 kg (113 lb 1.5 oz)   LMP 05/02/2023 (Within " Days)   SpO2 100%   BMI 22.09 kg/m²         Physical Exam  Vitals and nursing note reviewed.   Constitutional:       General: She is not in acute distress.     Appearance: Normal appearance. She is not toxic-appearing.   HENT:      Head: Normocephalic and atraumatic.      Jaw: There is normal jaw occlusion.      Mouth/Throat:      Mouth: Mucous membranes are moist.   Eyes:      General: Lids are normal.      Extraocular Movements: Extraocular movements intact.      Conjunctiva/sclera: Conjunctivae normal.      Pupils: Pupils are equal, round, and reactive to light.   Cardiovascular:      Rate and Rhythm: Normal rate and regular rhythm.      Pulses: Normal pulses.      Heart sounds: Normal heart sounds.   Pulmonary:      Effort: Pulmonary effort is normal. No respiratory distress.      Breath sounds: Normal breath sounds. No wheezing or rhonchi.   Abdominal:      General: Abdomen is flat. There is no distension.      Palpations: Abdomen is soft.      Tenderness: There is abdominal tenderness (Mild) in the left lower quadrant. There is no guarding or rebound.   Musculoskeletal:         General: Normal range of motion.      Cervical back: Normal range of motion and neck supple.      Right lower leg: No edema.      Left lower leg: No edema.   Skin:     General: Skin is warm and dry.   Neurological:      General: No focal deficit present.      Mental Status: She is alert and oriented to person, place, and time. Mental status is at baseline.   Psychiatric:         Mood and Affect: Mood normal.         Behavior: Behavior normal.                      Procedures:  Procedures      Medical Decision Making:      Comorbidities that affect care:    None    External Notes reviewed:          The following orders were placed and all results were independently analyzed by me:  Orders Placed This Encounter   Procedures    COVID-19, FLU A/B, RSV PCR 1 HR TAT - Swab, Nasopharynx    CT Abdomen Pelvis With Contrast    [place] Draw     Comprehensive Metabolic Panel    Lipase    Urinalysis With Microscopic If Indicated (No Culture) - Urine, Clean Catch    CBC Auto Differential    NPO Diet NPO Type: Strict NPO    Undress & Gown    Insert Peripheral IV    CBC & Differential    Green Top (Gel)    Lavender Top    Gold Top - SST    Light Blue Top       Medications Given in the Emergency Department:  Medications   sodium chloride 0.9 % flush 10 mL (has no administration in time range)   ondansetron (ZOFRAN) injection 4 mg (4 mg Intravenous Given 11/5/24 1803)   ketorolac (TORADOL) injection 30 mg (30 mg Intravenous Given 11/5/24 1803)   iopamidol (ISOVUE-370) 76 % injection 100 mL (70 mL Intravenous Given 11/5/24 1836)        ED Course:    The patient was initially evaluated in the triage area where orders were placed. The patient was later dispositioned by Toribio Garzon PA-C.      The patient was advised to stay for completion of workup which includes but is not limited to communication of labs and radiological results, reassessment and plan. The patient was advised that leaving prior to disposition by a provider could result in critical findings that are not communicated to the patient.          Labs:    Lab Results (last 24 hours)       Procedure Component Value Units Date/Time    COVID-19, FLU A/B, RSV PCR 1 HR TAT - Swab, Nasopharynx [[id number]]  (Normal) Collected: 11/05/24 1709    Specimen: Swab from Nasopharynx Updated: 11/05/24 1804     COVID19 Not Detected     Influenza A PCR Not Detected     Influenza B PCR Not Detected     RSV, PCR Not Detected    Narrative:      Fact sheet for providers: https://www.fda.gov/media/413583/download    Fact sheet for patients: https://www.fda.gov/media/042087/download    Test performed by PCR.    CBC & Differential [[id number]]  (Abnormal) Collected: 11/05/24 1711    Specimen: Blood from Arm, Right Updated: 11/05/24 1729    Narrative:      The following orders were created for panel order CBC &  Differential.  Procedure                               Abnormality         Status                     ---------                               -----------         ------                     CBC Auto Differential[[id number]]        Abnormal            Final result                 Please view results for these tests on the individual orders.    Comprehensive Metabolic Panel [[id number]]  (Abnormal) Collected: 11/05/24 1711    Specimen: Blood from Arm, Right Updated: 11/05/24 1757     Glucose 96 mg/dL      BUN 5 mg/dL      Creatinine 0.83 mg/dL      Sodium 135 mmol/L      Potassium 3.6 mmol/L      Chloride 99 mmol/L      CO2 23.9 mmol/L      Calcium 9.1 mg/dL      Total Protein 7.3 g/dL      Albumin 4.3 g/dL      ALT (SGPT) 16 U/L      AST (SGOT) 17 U/L      Alkaline Phosphatase 90 U/L      Total Bilirubin 0.3 mg/dL      Globulin 3.0 gm/dL      A/G Ratio 1.4 g/dL      BUN/Creatinine Ratio 6.0     Anion Gap 12.1 mmol/L      eGFR 96.8 mL/min/1.73     Narrative:      GFR Normal >60  Chronic Kidney Disease <60  Kidney Failure <15      Lipase [[id number]]  (Abnormal) Collected: 11/05/24 1711    Specimen: Blood from Arm, Right Updated: 11/05/24 1757     Lipase 12 U/L     CBC Auto Differential [[id number]]  (Abnormal) Collected: 11/05/24 1711    Specimen: Blood from Arm, Right Updated: 11/05/24 1729     WBC 6.87 10*3/mm3      RBC 5.34 10*6/mm3      Hemoglobin 15.0 g/dL      Hematocrit 45.5 %      MCV 85.2 fL      MCH 28.1 pg      MCHC 33.0 g/dL      RDW 13.2 %      RDW-SD 41.0 fl      MPV 10.4 fL      Platelets 206 10*3/mm3      Neutrophil % 76.0 %      Lymphocyte % 12.2 %      Monocyte % 10.6 %      Eosinophil % 0.3 %      Basophil % 0.6 %      Immature Grans % 0.3 %      Neutrophils, Absolute 5.22 10*3/mm3      Lymphocytes, Absolute 0.84 10*3/mm3      Monocytes, Absolute 0.73 10*3/mm3      Eosinophils, Absolute 0.02 10*3/mm3      Basophils, Absolute 0.04 10*3/mm3      Immature Grans, Absolute 0.02 10*3/mm3      nRBC 0.0 /100 WBC      Urinalysis With Microscopic If Indicated (No Culture) - Urine, Clean Catch [[id number]]  (Abnormal) Collected: 11/05/24 1726    Specimen: Urine, Clean Catch Updated: 11/05/24 1734     Color, UA Yellow     Appearance, UA Clear     pH, UA 6.0     Specific Gravity, UA <=1.005     Glucose, UA Negative     Ketones, UA 15 mg/dL (1+)     Bilirubin, UA Negative     Blood, UA Negative     Protein, UA Negative     Leuk Esterase, UA Negative     Nitrite, UA Negative     Urobilinogen, UA 0.2 E.U./dL    Narrative:      Urine microscopic not indicated.             Imaging:    CT Abdomen Pelvis With Contrast    Result Date: 11/5/2024  CT ABDOMEN PELVIS W CONTRAST Date of Exam: 11/5/2024 6:17 PM EST Indication: llq abd pain radiating to left flank. Comparison: CT abdomen pelvis 8/23/2023 Technique: Axial CT images were obtained of the abdomen and pelvis after the uneventful intravenous administration of iodinated contrast. Reconstructed coronal and sagittal images were also obtained. Automated exposure control and iterative construction methods were used. Findings: The lung bases are clear. A small hiatal hernia is evident. The liver is of normal size and uniform density. The gallbladder is not abnormally distended. No pancreatic or adrenal mass is evident. The spleen is of normal size. The kidneys enhance bilaterally. No renal or ureteral stones are seen. There is no evidence of hydronephrosis. The urinary bladder is not abnormally distended. The uterus is absent. 2 cm left ovarian cyst is probably functional The stomach is not abnormally distended. Bowel loops are of normal caliber. No significant stool is evident. The appendix is normal. Small umbilical hernia containing fat is stable. Bony structures appear intact.     Impression: CT scan of the abdomen and pelvis with IV contrast demonstrating small hiatal hernia. Post hysterectomy. Electronically Signed: Chema Pinzon MD  11/5/2024 6:54 PM EST  Workstation ID: [id number]        Differential Diagnosis and Discussion:      Abdominal Pain: Based on the patient's signs and symptoms, I considered abdominal aortic aneurysm, small bowel obstruction, pancreatitis, acute cholecystitis, acute appendecitis, peptic ulcer disease, gastritis, colitis, endocrine disorders, irritable bowel syndrome and other differential diagnosis an etiology of the patient's abdominal pain.    All labs were reviewed and interpreted by me.  CT scan radiology impression was interpreted by me.    MDM     CBC shows no evidence of leukocytosis.  COVID, influenza, RSV negative.  CT abdomen pelvis with contrast showed small hiatal hernia.  Patient is resting comfortably at this time.  Patient oxygen saturation is 100% on room air.  I will send patient home with Zofran.  I instructed patient to return to ED if she develops any new or worsening symptoms.  Patient states she understands and agrees with plan of care.              Patient Care Considerations:          Consultants/Shared Management Plan:        Social Determinants of Health:    Patient is independent, reliable, and has access to care.       Disposition and Care Coordination:    Discharged: The patient is suitable and stable for discharge with no need for consideration of admission.    I have explained the patient´s condition, diagnoses and treatment plan based on the information available to me at this time. I have answered questions and addressed any concerns. The patient has a good  understanding of the patient´s diagnosis, condition, and treatment plan as can be expected at this point. The vital signs have been stable. The patient´s condition is stable and appropriate for discharge from the emergency department.      The patient will pursue further outpatient evaluation with the primary care physician or other designated or consulting physician as outlined in the discharge instructions. They are agreeable to this plan of care and follow-up instructions have  been explained in detail. The patient has received these instructions in written format and has expressed an understanding of the discharge instructions. The patient is aware that any significant change in condition or worsening of symptoms should prompt an immediate return to this or the closest emergency department or call to 911.  I have explained discharge medications and the need for follow up with the patient/caretakers. This was also printed in the discharge instructions. Patient was discharged with the following medications and follow up:      Medication List        New Prescriptions      ondansetron ODT 4 MG disintegrating tablet  Commonly known as: ZOFRAN-ODT  Place 1 tablet on the tongue Every 8 (Eight) Hours As Needed for Nausea for up to 5 days.               Where to Get Your Medications        These medications were sent to [facility]/pharmacy #[id number] - [place], [place] - [address] - [phone] [facility] [phone]   [address] [place] [address]      Hours: 24-hours Phone: [phone]   ondansetron ODT 4 MG disintegrating tablet      Provider, No Known  [facility]  [place] [address]    Call in 1 day  To schedule follow-up       Final diagnoses:   Viral gastroenteritis        ED Disposition       ED Disposition   Discharge    Condition   Stable    Comment   --               This medical record created using voice recognition software.             Toribio Garzon PA-C  11/05/24 8046

## 2025-05-08 ENCOUNTER — OFFICE VISIT (OUTPATIENT)
Dept: CARDIOLOGY | Facility: CLINIC | Age: 30
End: 2025-05-08
Payer: COMMERCIAL

## 2025-05-08 VITALS
OXYGEN SATURATION: 98 % | WEIGHT: 124.4 LBS | DIASTOLIC BLOOD PRESSURE: 70 MMHG | SYSTOLIC BLOOD PRESSURE: 112 MMHG | HEART RATE: 120 BPM | HEIGHT: 60 IN | BODY MASS INDEX: 24.42 KG/M2

## 2025-05-08 DIAGNOSIS — R00.1 BRADYCARDIA, SINUS: Primary | ICD-10-CM

## 2025-05-08 DIAGNOSIS — R55 VASOVAGAL SYNCOPE: ICD-10-CM

## 2025-05-08 PROCEDURE — 99213 OFFICE O/P EST LOW 20 MIN: CPT | Performed by: NURSE PRACTITIONER

## 2025-05-08 NOTE — PROGRESS NOTES
Chief Complaint  Follow-up and bradycardia, sinus    Subjective            History of Present Illness  Chelsy Patterson is a 30-year-old female patient who presents to the office today for follow up.    History of Present Illness  The patient presents for a follow-up visit.    She has a documented history of bradycardia and symptoms suggestive of autonomic dysfunction. Her last consultation with Dr. Ulloa resulted in a recommendation to maintain hydration and use compression socks as part of her symptom management strategy. She reports that her symptoms have been exacerbated since margaret COVID-19 in 2019. Despite seeking medical attention, she experiences episodes of syncope, tachycardia, and a sensation akin to an adrenaline surge. These symptoms have significantly impacted her daily activities. She identifies temperature changes, such as those experienced after showering, as triggers for these episodes. She describes a constant state of recovery, which is physically draining. She expresses concern about the potential risk of myocardial infarction given her current health status. She has conducted personal research on dysautonomia and suspects she may also have hypermobility, which could explain some of her symptoms. She has identified certain triggers for her symptoms and finds that increased hydration and sodium intake provide some relief. She has always struggled with temperature regulation and questions whether dysautonomia could be a contributing factor.        PMH  Past Medical History:   Diagnosis Date    ADHD     Anemia     Anxiety     Chlamydia     Chronic post-traumatic stress disorder (PTSD)     no issues coming out of anesthesia    Diverticulitis     Fibroid     Gonorrhea     PCOS (polycystic ovarian syndrome)     PONV (postoperative nausea and vomiting)     Scoliosis     neck         ALLERGY  Allergies   Allergen Reactions    Bentyl [Dicyclomine] Nausea And Vomiting          SURGICALHX  Past  Surgical History:   Procedure Laterality Date    CYSTOSCOPY N/A 09/26/2023    Procedure: CYSTOSCOPY;  Surgeon: Dejah Hussein MD;  Location: [facility] MAIN OR;  Service: Gynecology;  Laterality: N/A;    SALPINGECTOMY      TOTAL LAPAROSCOPIC HYSTERECTOMY N/A 09/26/2023    Procedure: TOTAL LAPAROSCOPIC HYSTERECTOMY;  Surgeon: Dejah Hussein MD;  Location: [facility] MAIN OR;  Service: Gynecology;  Laterality: N/A;          SOC  Social History     Socioeconomic History    Marital status:    Tobacco Use    Smoking status: Every Day     Current packs/day: 0.50     Average packs/day: 0.5 packs/day for 15.0 years (7.5 ttl pk-yrs)     Types: Cigarettes    Smokeless tobacco: Never   Vaping Use    Vaping status: Some Days    Substances: Nicotine    Devices: Disposable   Substance and Sexual Activity    Alcohol use: Yes     Comment: occ    Drug use: Yes     Frequency: 5.0 times per week     Comment: THC    Sexual activity: Yes     Partners: Male     Birth control/protection: Hysterectomy         FAMHX  Family History   Adopted: Yes   Problem Relation Age of Onset    Breast cancer Maternal Grandmother     Clotting disorder Father         Regularly hospitalized for blood clots throughout his body    Heart attack Maternal Grandfather     Heart attack Paternal Grandfather     Colon cancer Neg Hx     Melanoma Neg Hx     Pulmonary embolism Neg Hx     Deep vein thrombosis Neg Hx     Uterine cancer Neg Hx     Ovarian cancer Neg Hx           MEDSIGONLY  Current Outpatient Medications on File Prior to Visit   Medication Sig    amphetamine-dextroamphetamine (ADDERALL) 30 MG tablet Take 1 tablet by mouth Daily. Takes half tablet daily    amphetamine-dextroamphetamine XR (ADDERALL XR) 30 MG 24 hr capsule Take 1 capsule by mouth Every Morning    [DISCONTINUED] cyclobenzaprine (FLEXERIL) 10 MG tablet Take 1 tablet by mouth 3 (Three) Times a Day As Needed for Muscle Spasms.     No current facility-administered medications on  "file prior to visit.         Objective   /70   Pulse 120   Ht 152.4 cm (60\")   Wt 56.4 kg (124 lb 6.4 oz)   SpO2 98%   BMI 24.30 kg/m²       Physical Exam  Constitutional:       Appearance: She is normal weight.   HENT:      Head: Normocephalic.   Neck:      Vascular: No carotid bruit.   Cardiovascular:      Rate and Rhythm: Regular rhythm. Tachycardia present.      Pulses: Normal pulses.      Heart sounds: Normal heart sounds. No murmur heard.  Pulmonary:      Effort: Pulmonary effort is normal.      Breath sounds: Normal breath sounds.   Musculoskeletal:      Cervical back: Neck supple.      Right lower leg: No edema.      Left lower leg: No edema.   Skin:     General: Skin is dry.   Neurological:      Mental Status: She is alert and oriented to person, place, and time.   Psychiatric:         Behavior: Behavior normal.       Result Review :   The following data was reviewed by: JENNIFER Dill on 05/08/2025:  No results found for: \"PROBNP\"  CMP          11/5/2024    17:11   CMP   Glucose 96    BUN 5    Creatinine 0.83    EGFR 96.8    Sodium 135    Potassium 3.6    Chloride 99    Calcium 9.1    Total Protein 7.3    Albumin 4.3    Globulin 3.0    Total Bilirubin 0.3    Alkaline Phosphatase 90    AST (SGOT) 17    ALT (SGPT) 16    Albumin/Globulin Ratio 1.4    BUN/Creatinine Ratio 6.0    Anion Gap 12.1      CBC w/diff          11/5/2024    17:11   CBC w/Diff   WBC 6.87    RBC 5.34    Hemoglobin 15.0    Hematocrit 45.5    MCV 85.2    MCH 28.1    MCHC 33.0    RDW 13.2    Platelets 206    Neutrophil Rel % 76.0    Immature Granulocyte Rel % 0.3    Lymphocyte Rel % 12.2    Monocyte Rel % 10.6    Eosinophil Rel % 0.3    Basophil Rel % 0.6       Lab Results   Component Value Date    TSH 0.741 09/26/2023      Lab Results   Component Value Date    FREET4 1.25 06/15/2022      No results found for: \"DDIMERQUANT\"  Magnesium   Date Value Ref Range Status   09/27/2023 2.1 1.6 - 2.6 mg/dL Final      No results found " "for: \"DIGOXIN\"   Lab Results   Component Value Date    TROPONINT <0.010 01/20/2022           Results for orders placed during the hospital encounter of 09/26/23    Adult Transthoracic Echo Complete W/ Cont if Necessary Per Protocol    Interpretation Summary    Left ventricular systolic function is normal. Calculated left ventricular EF = 65.6%    Left ventricular diastolic function was normal.    Estimated right ventricular systolic pressure from tricuspid regurgitation is normal (<35 mmHg).           Assessment and Plan    Diagnoses and all orders for this visit:    1. Bradycardia, sinus (Primary)    2. Vasovagal syncope      Assessment & Plan  1. Autonomic dysfunction.  Her blood pressure readings are within the normal range today; however, her heart rate is elevated. The potential risks associated with pharmacological interventions to either lower or raise her heart rate and blood pressure were discussed. She was advised to manage her symptoms non-pharmacologically by ensuring a daily intake of at least 3 liters of water and the consistent use of compression socks. The physiological benefits of these measures in regulating blood pressure and heart rate were explained. She was also advised to increase her fluid intake during warmer months to counteract the effects of vasodilation. A referral to Dr. Jennifer Wheat, a cardiologist with expertise in autonomic dysfunction, will be initiated. If her symptoms persist despite these measures, a referral to a more specialized cardiologist will be considered.      Follow Up   Return in about 1 year (around 5/8/2026) for Follow up with Dr Ulloa.    Patient was given instructions and counseling regarding her condition or for health maintenance advice. Please see specific information pulled into the AVS if appropriate.     Chelsy Lopez Leonardo  reports that she has been smoking cigarettes. She has a 7.5 pack-year smoking history. She has never used smokeless tobacco.      "       Patient or patient representative verbalized consent for the use of Ambient Listening during the visit with  JENNIFER Dill for chart documentation. 5/12/2025  12:59 EDT    JENNIFER Dill  05/08/25  10:06 EDT    Dictated Utilizing Dragon Dictation

## 2025-06-25 ENCOUNTER — OFFICE VISIT (OUTPATIENT)
Dept: CARDIOLOGY | Age: 30
End: 2025-06-25
Payer: COMMERCIAL

## 2025-06-25 VITALS
DIASTOLIC BLOOD PRESSURE: 70 MMHG | HEART RATE: 62 BPM | WEIGHT: 119 LBS | SYSTOLIC BLOOD PRESSURE: 110 MMHG | HEIGHT: 60 IN | BODY MASS INDEX: 23.36 KG/M2

## 2025-06-25 DIAGNOSIS — R42 POSTURAL DIZZINESS WITH PRESYNCOPE: Primary | ICD-10-CM

## 2025-06-25 DIAGNOSIS — R55 POSTURAL DIZZINESS WITH PRESYNCOPE: Primary | ICD-10-CM

## 2025-06-25 DIAGNOSIS — G90.A POTS (POSTURAL ORTHOSTATIC TACHYCARDIA SYNDROME): ICD-10-CM

## 2025-06-25 NOTE — PROGRESS NOTES
Date of Office Visit: 2025  Encounter Provider: Jennifer Wheat MD  Place of Service: [facility] CARDIOLOGY  Patient Name: Chelsy Patterson  :1995      Patient ID:  Chelsy Patterson is a 30 y.o. female is here for dizziness          History of Present Illness    She has a history of bradycardia.  She is here for dizziness.    She is adopted.  She is , has 2 children, rare alcohol, half pack of cigarettes a day use, 3-4 sodas per day, no drugs, she occasionally vapes.    She had a nonischemic treadmill stress study done 2023 exercising for 9 minutes and 40 seconds on the Alonso protocol, achieving 11.3 METS, normal blood pressure response to exercise, no ischemic ECG changes.  Echocardiogram done 2023 was normal with ejection fraction of 66%.  She wore a 48-hour Holter monitor 2023 with an average heart rate of 94 bpm, normal monitor.  Laboratory values on 2024 show normal CMP and normal CBC.    She has been having episodes of heart racing, heart irregularity and dizziness for 7 years.  This got worse after she had COVID in 2019.  She has had episodes of syncope.  When she is head upright, she feels dizzy and weak.  She gets pulling in her legs.  She has temperature ovulation issues.  She has used hydration and compression.  She generally just does not feel well.  She had a hysterectomy for PCOS in the past.  She does note that certain foods make her feel worse.    Past Medical History:   Diagnosis Date    ADHD     Anemia     Anxiety     Chlamydia     Chronic post-traumatic stress disorder (PTSD)     no issues coming out of anesthesia    Diverticulitis     Fibroid     Gonorrhea     PCOS (polycystic ovarian syndrome)     PONV (postoperative nausea and vomiting)     Scoliosis     neck         Past Surgical History:   Procedure Laterality Date    CYSTOSCOPY N/A 2023    Procedure: CYSTOSCOPY;  Surgeon: Dejah Hussein MD;   "Location: [facility];  Service: Gynecology;  Laterality: N/A;    SALPINGECTOMY      TOTAL LAPAROSCOPIC HYSTERECTOMY N/A 09/26/2023    Procedure: TOTAL LAPAROSCOPIC HYSTERECTOMY;  Surgeon: Dejah Hussein MD;  Location: [facility];  Service: Gynecology;  Laterality: N/A;       Current Outpatient Medications on File Prior to Visit   Medication Sig Dispense Refill    amphetamine-dextroamphetamine XR (ADDERALL XR) 30 MG 24 hr capsule Take 1 capsule by mouth Every Morning      amphetamine-dextroamphetamine (ADDERALL) 30 MG tablet Take 1 tablet by mouth Daily. Takes half tablet daily (Patient not taking: Reported on 6/25/2025)       No current facility-administered medications on file prior to visit.       Social History     Socioeconomic History    Marital status:     Number of children: 2   Tobacco Use    Smoking status: Every Day     Current packs/day: 0.50     Average packs/day: 0.5 packs/day for 15.0 years (7.5 ttl pk-yrs)     Types: Cigarettes     Passive exposure: Current    Smokeless tobacco: Never    Tobacco comments:     Caffeine: 3-4 sodas daily   Vaping Use    Vaping status: Some Days    Substances: Nicotine    Devices: Disposable   Substance and Sexual Activity    Alcohol use: Yes     Comment: occ    Drug use: Not Currently     Frequency: 5.0 times per week     Comment: THC    Sexual activity: Yes     Partners: Male     Birth control/protection: Hysterectomy          No results found for: \"CHLPL\", \"TRIG\", \"HDL\", \"VLDL\", \"LDL\"     Procedures    ECG 12 Lead    Date/Time: 6/25/2025 10:56 AM  Performed by: Jennifer Wheat MD    Authorized by: Jennifer Wheat MD  Comparison: compared with previous ECG   Similar to previous ECG  Rhythm: sinus rhythm    Clinical impression: normal ECG            Objective:      Vitals:    06/25/25 1049   BP: 110/70   Pulse: 62   Weight: 54 kg (119 lb)   Height: 152.4 cm (60\")     Body mass index is 23.24 kg/m².    Vitals reviewed.   Constitutional:  "      General: Not in acute distress.     Appearance: Not diaphoretic.   Neck:      Vascular: No carotid bruit or JVD.   Pulmonary:      Effort: Pulmonary effort is normal.      Breath sounds: Normal breath sounds.   Cardiovascular:      Normal rate. Regular rhythm.      Murmurs: There is no murmur.      No gallop.  No rub.   Pulses:     Intact distal pulses.      Carotid: 2+ bilaterally.     Radial: 2+ bilaterally.     Dorsalis pedis: 2+ bilaterally.     Posterior tibial: 2+ bilaterally.  Edema:     Peripheral edema absent.   Neurological:      Cranial Nerves: No cranial nerve deficit.       Lab Review:       Assessment:      Diagnosis Plan   1. Postural dizziness with presyncope          Dizziness with tachycardia and irregular heart rate, episodes of near syncope and syncope.  Orthostatics today show blood pressure 118/78 with heart of 67 after lying flat for 5 minutes, blood pressure 110/80 with a heart rate of 80 after standing for 1 minute, blood pressure 108/84 with a heart rate of 120 after standing for 3 minutes.  She has POTS by definition.  ADD  Tobacco use, advise cessation  Temperature dysregulation which goes along with POTS.       Plan:       [facility] paper on POTS provided with lifestyle papers given to her.  She will work on this.  She will follow-up with Analilia Castorena.  No medication changes made today but in the future she might benefit from Corlanor and/or midodrine.  For now, I want her to do lifestyle changes first which include hydration, cardiovascular exercise on recumbent bike or rowing machine, cardiovascular exercise in the pool, lower extremity strengthening, compression stockings, salting all protein sources, avoiding carbohydrates, may need to sleep with her head upright instead of flat in bed.

## 2025-08-08 ENCOUNTER — OFFICE VISIT (OUTPATIENT)
Dept: CARDIOLOGY | Facility: CLINIC | Age: 30
End: 2025-08-08
Payer: COMMERCIAL

## 2025-08-08 VITALS
HEIGHT: 60 IN | BODY MASS INDEX: 22.58 KG/M2 | SYSTOLIC BLOOD PRESSURE: 110 MMHG | WEIGHT: 115 LBS | HEART RATE: 86 BPM | DIASTOLIC BLOOD PRESSURE: 72 MMHG

## 2025-08-08 DIAGNOSIS — Z13.29: ICD-10-CM

## 2025-08-08 DIAGNOSIS — R42 POSTURAL DIZZINESS WITH PRESYNCOPE: Primary | ICD-10-CM

## 2025-08-08 DIAGNOSIS — R55 POSTURAL DIZZINESS WITH PRESYNCOPE: Primary | ICD-10-CM

## 2025-08-08 PROCEDURE — 99214 OFFICE O/P EST MOD 30 MIN: CPT | Performed by: NURSE PRACTITIONER

## 2025-08-08 RX ORDER — MIDODRINE HYDROCHLORIDE 2.5 MG/1
2.5 TABLET ORAL NIGHTLY
Qty: 30 TABLET | Refills: 0 | Status: SHIPPED | OUTPATIENT
Start: 2025-08-08

## (undated) DEVICE — ENDOPATH XCEL BLADELESS TROCARS WITH STABILITY SLEEVES: Brand: ENDOPATH XCEL

## (undated) DEVICE — TROCAR: Brand: KII OPTICAL ACCESS SYSTEM

## (undated) DEVICE — INTENDED FOR TISSUE SEPARATION, AND OTHER PROCEDURES THAT REQUIRE A SHARP SURGICAL BLADE TO PUNCTURE OR CUT.: Brand: BARD-PARKER ® CARBON RIB-BACK BLADES

## (undated) DEVICE — PREP TRAY WITH CHG: Brand: MEDLINE INDUSTRIES, INC.

## (undated) DEVICE — GYN LAPAROSCOPY-LF: Brand: MEDLINE INDUSTRIES, INC.

## (undated) DEVICE — MANIP UTER RUMI 2 KOH EFFICIENT 3.5CM BL

## (undated) DEVICE — SUPER ATRAUMATIC GRABBER TIP, DISPOSABLE: Brand: RENEW

## (undated) DEVICE — GLV SURG BIOGEL LTX PF 7

## (undated) DEVICE — COVADERM PLUS: Brand: DEROYAL

## (undated) DEVICE — MARYLAND JAW LAPAROSCOPIC SEALER/DIVIDER COATED: Brand: LIGASURE

## (undated) DEVICE — ENDOPATH XCEL WITH OPTIVIEW TECHNOLOGY BLADELESS TROCARS WITH STABILITY SLEEVES: Brand: ENDOPATH XCEL OPTIVIEW

## (undated) DEVICE — SLV SCD KN/LEN ADJ EXPRSS BLENDED MD 1P/U

## (undated) DEVICE — ELECTROSURGICAL DEVICE HOLSTER;FOR USE WITH MAXIMUM PEAK VOLTAGE OF 4000 V: Brand: FORCE TRIVERSE

## (undated) DEVICE — SOL IRRG H2O BG 3000ML STRL

## (undated) DEVICE — MANIP UTER RUMI TP 6.7MMX8CM BLU

## (undated) DEVICE — LAPAROSCOPIC DISSECTOR: Brand: DEROYAL

## (undated) DEVICE — LAPAROSCOPIC SMOKE EVACUATION SYSTEM ACTIVE AND PASSIVE: Brand: VALLEYLAB

## (undated) DEVICE — GLV SURG BIOGEL LTX PF 6 1/2

## (undated) DEVICE — LAPAROSCOPIC WIRE J-HOOK ELECTRODE COATED: Brand: CLEANCOAT

## (undated) DEVICE — TOTAL TRAY, 16FR 10ML SIL FOLEY, URN: Brand: MEDLINE

## (undated) DEVICE — SUT MNCRYL PLS ANTIB UD 4/0 PS2 18IN

## (undated) DEVICE — 40585 XL ADVANCED TRENDELENBURG POSITIONING KIT: Brand: 40585 XL ADVANCED TRENDELENBURG POSITIONING KIT

## (undated) DEVICE — VAGINAL PREP TRAY: Brand: MEDLINE INDUSTRIES, INC.

## (undated) DEVICE — CYSTO/BLADDER IRRIGATION SET, REGULATING CLAMP